# Patient Record
Sex: FEMALE | Race: WHITE
[De-identification: names, ages, dates, MRNs, and addresses within clinical notes are randomized per-mention and may not be internally consistent; named-entity substitution may affect disease eponyms.]

---

## 2017-02-05 ENCOUNTER — HOSPITAL ENCOUNTER (EMERGENCY)
Dept: HOSPITAL 62 - ER | Age: 37
Discharge: HOME | End: 2017-02-05
Payer: SELF-PAY

## 2017-02-05 VITALS — DIASTOLIC BLOOD PRESSURE: 60 MMHG | SYSTOLIC BLOOD PRESSURE: 122 MMHG

## 2017-02-05 DIAGNOSIS — F41.9: ICD-10-CM

## 2017-02-05 DIAGNOSIS — F17.200: ICD-10-CM

## 2017-02-05 DIAGNOSIS — L53.8: ICD-10-CM

## 2017-02-05 DIAGNOSIS — K12.1: Primary | ICD-10-CM

## 2017-02-05 PROCEDURE — 99283 EMERGENCY DEPT VISIT LOW MDM: CPT

## 2017-02-05 PROCEDURE — 87070 CULTURE OTHR SPECIMN AEROBIC: CPT

## 2017-02-05 PROCEDURE — 87880 STREP A ASSAY W/OPTIC: CPT

## 2017-02-05 NOTE — ER DOCUMENT REPORT
HPI





- HPI


Patient complains to provider of: sore throat


Onset: Yesterday


Onset/Duration: Gradual


Pain Level: 4


Context: 


36-year-old female that is on Suboxone is complaining of a sore throat and also 

some facial swelling and red skin in her hands after she was taking over-the-

counter medicine for a cold.  She has upper dentures.  She has no cough.  No 

chest pain or shortness of breath.  No abdominal pain.  No nausea vomiting or 

diarrhea.  No fever.


Associated Symptoms: None


Exacerbated by: Denies


Relieved by: Denies


Similar symptoms previously: No


Recently seen / treated by doctor: No





- ROS


ROS below otherwise negative: Yes


Systems Reviewed and Negative: Yes All other systems reviewed and negative





- CARDIOVASCULAR


Cardiovascular: DENIES: Chest pain





- DERM


Skin Color: Normal





Past Medical History





- General


Information source: Patient





- Social History


Smoking Status: Current Every Day Smoker


Chew tobacco use (# tins/day): No


Frequency of alcohol use: None


Drug Abuse: None


Lives with: Spouse/Significant other


Family History: Reviewed & Not Pertinent


Patient has suicidal ideation: No


Patient has homicidal ideation: No


Renal/ Medical History: Denies: Hx Peritoneal Dialysis


Psychiatric Medical History: Reports: Hx Bipolar Disorder


Surgical Hx: Negative





- Immunizations


Hx Diphtheria, Pertussis, Tetanus Vaccination: No





Vertical Provider Document





- CONSTITUTIONAL


Agree With Documented VS: Yes


Exam Limitations: No Limitations





- INFECTION CONTROL


TRAVEL OUTSIDE OF THE [place] IN LAST 30 DAYS: No





- HEENT


HEENT: Normocephalic, PERRLA.  negative: Conjuctival Injection, Pharyngeal 

Erythema


Notes: 


Canker sores buccal mucosa bilateral posterior pharynx is normal.  Uvula 

midline and nonswollen.





- NECK


Neck: Supple.  negative: Lymphadenopathy-Left, Lymphadenopathy-Right





- RESPIRATORY


Respiratory: Breath Sounds Normal, No Respiratory Distress


O2 Sat by Pulse Oximetry: 97





- CARDIOVASCULAR


Cardiovascular: Regular Rate, Regular Rhythm





- GI/ABDOMEN


Gastrointestinal: Abdomen Soft, Abdomen Non-Tender





- MUSCULOSKELETAL/EXTREMETIES


Musculoskeletal/Extremeties: MAEW, FROM





- NEURO


Level of Consciousness: Awake, Alert





- DERM


Integumentary: Dry - Skin , red, dorsal bilateral hands and forearms. No hives.





Course





- Vital Signs


Vital signs: 


 











Temp Pulse Resp BP Pulse Ox


 


 98.1 F   72   22 H  125/56 L  97 


 


 02/05/17 10:52  02/05/17 10:52  02/05/17 10:52  02/05/17 10:52  02/05/17 10:52














Discharge





- Discharge


Clinical Impression: 


 viral oral ulcers, Red skin, Anxiety





Condition: Good


Disposition: HOME, SELF-CARE


Instructions:  Mouth Sores ([facility]), Use of Diphenhydramine, Acid-Suppressing 

Medication ([facility])


Additional Instructions: 


take over the counter benadryl 50mg every 4-8 hours for rash


take over the counter pepcid 2omg twice a day


return to er if wrose


gargle with antibacterial mouthwash


new toothbrush when the ulcers go away


see dermatologist if rash persists


stop the over the counter cold medicine that may have caused the red skin


Prescriptions: 


Chlorhexidine Gluconate [Peridex] 15 ml MM QID #200 mouthwash


Referrals: 


LILA CAMARGO DO [ACTIVE STAFF] - Follow up as needed

## 2017-02-05 NOTE — ER DOCUMENT REPORT
ED Medical Screen (RME)





- General


Stated Complaint: SORE THROAT


Mode of Arrival: Ambulatory


Information source: Patient


Notes: 


Patient complains of sore throat for the past 3 weeks.  Patient complains of 

swelling to hands and feet





hx: Fibromyalgia, endometriosis





I have greeted and performed a rapid initial assessment of this patient.  A 

comprehensive ED assessment and evaluation of the patient, analysis of test 

results and completion of the medical decision making process will be conducted 

by additional ED providers.





- Related Data


Allergies/Adverse Reactions: 


 





No Known Allergies Allergy (Verified 02/05/17 10:53)


 











Physical Exam





- Vital signs


Vitals: 





 











Temp Pulse Resp BP Pulse Ox


 


 98.1 F   72   22 H  125/56 L  97 


 


 02/05/17 10:52  02/05/17 10:52  02/05/17 10:52  02/05/17 10:52  02/05/17 10:52














- HEENT


Mouth/Lips: No: Angioedema


Pharynx: Erythema





Course





- Vital Signs


Vital signs: 





 











Temp Pulse Resp BP Pulse Ox


 


 98.1 F   72   22 H  125/56 L  97 


 


 02/05/17 10:52  02/05/17 10:52  02/05/17 10:52  02/05/17 10:52  02/05/17 10:52

## 2017-11-27 ENCOUNTER — HOSPITAL ENCOUNTER (EMERGENCY)
Dept: HOSPITAL 62 - ER | Age: 37
Discharge: HOME | End: 2017-11-27
Payer: SELF-PAY

## 2017-11-27 VITALS — DIASTOLIC BLOOD PRESSURE: 58 MMHG | SYSTOLIC BLOOD PRESSURE: 105 MMHG

## 2017-11-27 DIAGNOSIS — M79.601: ICD-10-CM
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
## 2017-11-27 DIAGNOSIS — R52: ICD-10-CM

## 2017-11-27 DIAGNOSIS — S29.012A: Primary | ICD-10-CM

## 2017-11-27 DIAGNOSIS — F17.200: ICD-10-CM

## 2017-11-27 DIAGNOSIS — M54.6: ICD-10-CM

## 2017-11-27 PROCEDURE — 99283 EMERGENCY DEPT VISIT LOW MDM: CPT

## 2017-11-27 PROCEDURE — 71020: CPT

## 2017-11-27 NOTE — ER DOCUMENT REPORT
HPI





- HPI


Patient complains to provider of: Right upper back pain


Onset: Other - 4 months, worse over the past 3 days


Onset/Duration: Worse


Quality of pain: Sharp


Pain Level: 4


Context: 





Patient presents complaining of tender knots to right axilla that shoot pain 

around to her right upper back area for the past 4 months.  Patient states that 

certain positions would reproduce her pain and certainly movements of her right 

upper extremity.  Patient denies any injury.  Patient states that the pain 

recently became persistent and worse over the past 3 days.  Patient denies any 

cough or cold symptoms.  Patient denies any abdominal pain.  Patient denies any 

fever.


Associated Symptoms: Other - Right upper back tenderness


Exacerbated by: Movement


Relieved by: Remaining still


Similar symptoms previously: No


Recently seen / treated by doctor: No





- ROS


ROS below otherwise negative: Yes


Systems Reviewed and Negative: Yes All other systems reviewed and negative





- CONSTITUTIONAL


Constitutional: DENIES: Fever





- EENT


EENT: DENIES: Sore Throat





- NEURO


Neurology: DENIES: Headache





- CARDIOVASCULAR


Cardiovascular: DENIES: Chest pain





- RESPIRATORY


Respiratory: DENIES: Trouble Breathing, Coughing





- GASTROINTESTINAL


Gastrointestinal: DENIES: Abdominal Pain, Nausea





- MUSCULOSKELETAL


Musculoskeletal: REPORTS: Back Pain





- DERM


Skin Color: Normal


Skin Problems: None





Past Medical History





- General


Information source: Patient





- Social History


Smoking Status: Current Every Day Smoker


Chew tobacco use (# tins/day): No


Frequency of alcohol use: Occasional


Drug Abuse: None


Occupation: 


Family History: Reviewed & Not Pertinent


Patient has suicidal ideation: No


Patient has homicidal ideation: No


Renal/ Medical History: Reports: Other - Endometriosis.  Denies: Hx 

Peritoneal Dialysis


Psychiatric Medical History: Reports: Hx Bipolar Disorder


Past Surgical History: Reports: Hx Gynecologic Surgery





- Immunizations


Hx Diphtheria, Pertussis, Tetanus Vaccination: No





Vertical Provider Document





- CONSTITUTIONAL


Agree With Documented VS: Yes


Exam Limitations: No Limitations


General Appearance: WD/WN, No Apparent Distress





- INFECTION CONTROL


TRAVEL OUTSIDE OF THE [place] IN LAST 30 DAYS: No





- HEENT


HEENT: Atraumatic, Normocephalic





- NECK


Neck: Normal Inspection, Supple





- RESPIRATORY


Respiratory: Breath Sounds Normal, No Respiratory Distress, Chest Non-Tender


O2 Sat by Pulse Oximetry: 97





- CARDIOVASCULAR


Cardiovascular: Regular Rate, Regular Rhythm, No Murmur


Pulses: Normal: Radial





- BACK


Back: Abnormal Inspection - Tenderness with palpation of right thoracic back 

area.  Normal skin on examination..  negative: CVA Tenderness-Right, CVA 

Tenderness-Left





- MUSCULOSKELETAL/EXTREMETIES


Musculoskeletal/Extremeties: MAEW, Tender - Patient's right upper back 

tenderness reproduced with movement of right upper extremity, pain worsens with 

right arm abduction





- NEURO


Level of Consciousness: Awake, Alert, Appropriate


Motor/Sensory: No Motor Deficit





- DERM


Integumentary: Warm, Dry


Notes: 





Patient with 2 tender nodules to right axilla.  Suspect tender lymph nodes.  

Normal skin color and temperature overlying nodular lesions.





Course





- Re-evaluation


Re-evalutation: 





11/27/17 10:09


Consulted with Dr. Mckeon regarding patient presentation, discussed patient's 

exam findings.  Does not recommend any additional testing.  Agrees with 

discharge plan of care.


11/27/17


Patient concerned about possible breast cancer and does not feel that the 

tender nodules to right axillary lymph nodes.  Patient advised that she can 

follow-up as an outpatient with the primary doctor in obtain a mammogram to 

further evaluate these findings.  Given the chest x-ray report, no concern for 

pneumonia or pneumothorax.  Patient without any concerning symptoms for PE at 

this time, no abdominal tenderness, no concern for cholecystitis or pregnancy.





- Vital Signs


Vital signs: 


 











Temp Pulse Resp BP Pulse Ox


 


 97.7 F   78   16   105/60   97 


 


 11/27/17 07:03  11/27/17 07:03  11/27/17 07:03  11/27/17 07:03  11/27/17 07:03














- Diagnostic Test


Radiology reviewed: Reports reviewed





Discharge





- Discharge


Clinical Impression: 


 right axillary nodule tenderness





Muscle strain of right upper back


Qualifiers:


 Encounter type: initial encounter Qualified Code(s): S29.012A - Strain of 

muscle and tendon of back wall of thorax, initial encounter





Condition: Stable


Disposition: HOME, SELF-CARE


Instructions:  Growth or Mass, Pending Workup ([facility]), Muscle Relaxers ([facility]), 

Muscle Strain ([facility])


Additional Instructions: 


Return immediately for any new or worsening symptoms





Followup with your primary care provider, call tomorrow to make a followup 

appointment





Follow-up with the [facility] clinic.





Follow-up with the [facility], to seek assistance with getting a mammogram 

performed





A primary doctor can follow-up your chest x-ray report


Prescriptions: 


Methocarbamol [Robaxin 500 Mg Tablet] 500 mg PO QID PRN #24 tablet


 PRN Reason: 


Naproxen [Naprosyn 250 Nmg Tablet] 1 tab PO BID #14 tablet


Referrals: 


[facility] [Provider Group] - Follow up as needed


[facility] - Follow up as needed


[facility] [Provider Group] - Follow up tomorrow

## 2017-11-27 NOTE — RADIOLOGY REPORT (SQ)
EXAM DESCRIPTION:  CHEST PA/LAT



COMPLETED DATE/TIME:  11/27/2017 8:43 am



REASON FOR STUDY:  right thoracic back pain



COMPARISON:  None.



TECHNIQUE:  Frontal and lateral radiographic views of the chest acquired.



NUMBER OF VIEWS:  Two view.



LIMITATIONS:  None.



FINDINGS:  LUNGS AND PLEURA: No opacities, masses or pneumothorax. No pleural effusion.

MEDIASTINUM AND HILAR STRUCTURES: Small focal density projects over the left hilum, either a calcific
ation or artifact.  No contour abnormalities.

HEART AND VASCULAR STRUCTURES: Heart normal size.  No evidence for failure.

BONES: No acute findings.

HARDWARE: None in the chest.

OTHER: No other significant finding.



IMPRESSION:  NO SIGNIFICANT RADIOGRAPHIC FINDING IN THE CHEST.



TECHNICAL DOCUMENTATION:  JOB ID:  [id number]

 2011 [employer]- All Rights Reserved

## 2018-05-22 ENCOUNTER — HOSPITAL ENCOUNTER (EMERGENCY)
Dept: HOSPITAL 62 - ER | Age: 38
Discharge: HOME | End: 2018-05-22
Payer: SELF-PAY

## 2018-05-22 VITALS — DIASTOLIC BLOOD PRESSURE: 61 MMHG | SYSTOLIC BLOOD PRESSURE: 120 MMHG

## 2018-05-22 DIAGNOSIS — L03.211: Primary | ICD-10-CM

## 2018-05-22 DIAGNOSIS — R51: ICD-10-CM

## 2018-05-22 DIAGNOSIS — R68.84: ICD-10-CM

## 2018-05-22 DIAGNOSIS — K02.9: ICD-10-CM

## 2018-05-22 DIAGNOSIS — F17.210: ICD-10-CM

## 2018-05-22 LAB
ADD MANUAL DIFF: NO
ALBUMIN SERPL-MCNC: 4.2 G/DL (ref 3.5–5)
ALP SERPL-CCNC: 66 U/L (ref 38–126)
ALT SERPL-CCNC: 58 U/L (ref 9–52)
ANION GAP SERPL CALC-SCNC: 11 MMOL/L (ref 5–19)
APPEARANCE UR: (no result)
APTT PPP: YELLOW S
AST SERPL-CCNC: 59 U/L (ref 14–36)
BARBITURATES UR QL SCN: NEGATIVE
BASOPHILS # BLD AUTO: 0.1 10^3/UL (ref 0–0.2)
BASOPHILS NFR BLD AUTO: 0.8 % (ref 0–2)
BILIRUB DIRECT SERPL-MCNC: 0.3 MG/DL (ref 0–0.4)
BILIRUB SERPL-MCNC: 0.2 MG/DL (ref 0.1–1.1)
BILIRUB SERPL-MCNC: 0.5 MG/DL (ref 0.2–1.3)
BILIRUB UR QL STRIP: NEGATIVE
BUN SERPL-MCNC: 10 MG/DL (ref 7–20)
CALCIUM: 9.1 MG/DL (ref 8.4–10.2)
CHLORIDE SERPL-SCNC: 101 MMOL/L (ref 98–107)
CO2 SERPL-SCNC: 27 MMOL/L (ref 22–30)
CRP SERPL-MCNC: 34.8 MG/L (ref ?–10)
EOSINOPHIL # BLD AUTO: 0.3 10^3/UL (ref 0–0.6)
EOSINOPHIL NFR BLD AUTO: 3.9 % (ref 0–6)
ERYTHROCYTE [DISTWIDTH] IN BLOOD BY AUTOMATED COUNT: 13.9 % (ref 11.5–14)
ERYTHROCYTE [SEDIMENTATION RATE] IN BLOOD: 29 MM/HR (ref 0–20)
GLUCOSE SERPL-MCNC: 85 MG/DL (ref 75–110)
GLUCOSE UR STRIP-MCNC: NEGATIVE MG/DL
HCT VFR BLD CALC: 35.2 % (ref 36–47)
HGB BLD-MCNC: 12 G/DL (ref 12–15.5)
KETONES UR STRIP-MCNC: 80 MG/DL
LYMPHOCYTES # BLD AUTO: 1.7 10^3/UL (ref 0.5–4.7)
LYMPHOCYTES NFR BLD AUTO: 26.6 % (ref 13–45)
MCH RBC QN AUTO: 31.8 PG (ref 27–33.4)
MCHC RBC AUTO-ENTMCNC: 34.1 G/DL (ref 32–36)
MCV RBC AUTO: 93 FL (ref 80–97)
METHADONE UR QL SCN: NEGATIVE
MONOCYTES # BLD AUTO: 0.7 10^3/UL (ref 0.1–1.4)
MONOCYTES NFR BLD AUTO: 11.1 % (ref 3–13)
NEUTROPHILS # BLD AUTO: 3.7 10^3/UL (ref 1.7–8.2)
NEUTS SEG NFR BLD AUTO: 57.6 % (ref 42–78)
NITRITE UR QL STRIP: NEGATIVE
PCP UR QL SCN: NEGATIVE
PH UR STRIP: 5 [PH] (ref 5–9)
PLATELET # BLD: 286 10^3/UL (ref 150–450)
POTASSIUM SERPL-SCNC: 4 MMOL/L (ref 3.6–5)
PROT SERPL-MCNC: 7.2 G/DL (ref 6.3–8.2)
PROT UR STRIP-MCNC: NEGATIVE MG/DL
RBC # BLD AUTO: 3.77 10^6/UL (ref 3.72–5.28)
SODIUM SERPL-SCNC: 138.8 MMOL/L (ref 137–145)
SP GR UR STRIP: 1.01
TOTAL CELLS COUNTED % (AUTO): 100 %
URINE AMPHETAMINES SCREEN: NEGATIVE
URINE BENZODIAZEPINES SCREEN: NEGATIVE
URINE COCAINE SCREEN: (no result)
URINE MARIJUANA (THC) SCREEN: NEGATIVE
UROBILINOGEN UR-MCNC: NEGATIVE MG/DL (ref ?–2)
WBC # BLD AUTO: 6.5 10^3/UL (ref 4–10.5)

## 2018-05-22 PROCEDURE — 99406 BEHAV CHNG SMOKING 3-10 MIN: CPT

## 2018-05-22 PROCEDURE — 99284 EMERGENCY DEPT VISIT MOD MDM: CPT

## 2018-05-22 PROCEDURE — 85652 RBC SED RATE AUTOMATED: CPT

## 2018-05-22 PROCEDURE — 84703 CHORIONIC GONADOTROPIN ASSAY: CPT

## 2018-05-22 PROCEDURE — 36415 COLL VENOUS BLD VENIPUNCTURE: CPT

## 2018-05-22 PROCEDURE — 80307 DRUG TEST PRSMV CHEM ANLYZR: CPT

## 2018-05-22 PROCEDURE — 96361 HYDRATE IV INFUSION ADD-ON: CPT

## 2018-05-22 PROCEDURE — 70491 CT SOFT TISSUE NECK W/DYE: CPT

## 2018-05-22 PROCEDURE — 96372 THER/PROPH/DIAG INJ SC/IM: CPT

## 2018-05-22 PROCEDURE — 80053 COMPREHEN METABOLIC PANEL: CPT

## 2018-05-22 PROCEDURE — 86140 C-REACTIVE PROTEIN: CPT

## 2018-05-22 PROCEDURE — 85025 COMPLETE CBC W/AUTO DIFF WBC: CPT

## 2018-05-22 PROCEDURE — 87040 BLOOD CULTURE FOR BACTERIA: CPT

## 2018-05-22 PROCEDURE — 96374 THER/PROPH/DIAG INJ IV PUSH: CPT

## 2018-05-22 PROCEDURE — 81001 URINALYSIS AUTO W/SCOPE: CPT

## 2018-05-22 NOTE — RADIOLOGY REPORT (SQ)
EXAM DESCRIPTION:  CT SOFT TISSUE NECK WITH



COMPLETED DATE/TIME:  5/22/2018 1:26 pm



REASON FOR STUDY:  facial swelling



COMPARISON:  None.



TECHNIQUE:  Post IV contrasted scanning from skull base through lung apices with review of bone, soft
 tissue and lung windows.  Reconstructed coronal and sagittal MPR images reviewed.  All images stored
 on PACS.

All CT scanners at this facility use dose modulation, iterative reconstruction, and/or weight based d
osing when appropriate to reduce radiation dose to as low as reasonably achievable (ALARA).

[facility]: Dose Right  [facility]: CareDose    [facility]: Dose Right    [facility]: Teradose 4D    [facility]: Smart Technologies



CONTRAST TYPE AND DOSE:  contrast/concentration: Isovue 370.00 mg/ml; Total Contrast Delivered: 75.0 
ml; Total Saline Delivered: 55.0 ml



RENAL FUNCTION:  Creatinine 0.5



RADIATION DOSE:  CT Rad equipment meets quality standard of care and radiation dose reduction techniq
ues were employed. CTDIvol: 12.2 - 12.2 mGy. DLP: 762 mGy-cm. .



LIMITATIONS:  Patient moved throughout the study.



FINDINGS:  There is motion artifact throughout the study.  Limitations of this exam were discussed wi
noreen [place] in the emergency room.

Patient has left-sided facial cellulitis with soft tissue swelling over the pre maxillary and left ma
ndibular region of the face.

No well circumscribed abscess is identified.

No salivary gland stones.  No left-sided Wisner's duct stone is identified.

No bulky adenopathy.  Airway patent.

No gross vascular stenosis

There is mucous membrane thickening in the right maxillary and right ethmoid air cells.

Advanced dental caries



IMPRESSION:  Study significantly degraded by motion artifact.  There is facial cellulitis without ella
ss evidence of abscess.



TECHNICAL DOCUMENTATION:  JOB ID:  [id number]

Quality ID # [id number]: Final reports with documentation of one or more dose reduction techniques (e.g., Au
tomated exposure control, adjustment of the mA and/or kV according to patient size, use of iterative 
reconstruction technique)

 2011 [employer]- All Rights Reserved



Reading location - IP/workstation name: [email or web address]

## 2018-05-22 NOTE — ER DOCUMENT REPORT
ED Oral Problem





- General


Chief Complaint: Toothache


Stated Complaint: TOOTH PAIN


Time Seen by Provider: 05/22/18 11:06


Mode of Arrival: Ambulatory


Information source: Patient


Notes: 





38-year-old female presents to ED for complaint of dental pain with facial and 

jaw swelling.  She has a ulcerated area to the right upper gum swelling to the 

face up to the eye complain of a headache.  She states that the face and gum is 

been swollen for at least a week or more.  She states the ulcers been there for 

at least a week or more.  She states she does not have any insurance or any way 

to go to the dentist.  She states the pain is increasing and the swelling is 

increasing.  She is alert and oriented speaks with even full sentences.  She 

states that she had a fever of 102.2 last night but her temperature is 98.2 at 

this time.  She states she took Tylenol last night and got rid of the fever.


TRAVEL OUTSIDE OF THE [place] IN LAST 30 DAYS: No





- HPI


Patient complains to provider of: Swelling of face, Swelling of jaw, Toothache, 

Other - Ulcer to the left upper gum


Quality of pain: Pressure, Sharp, Throbbing


Severity: Severe


Pain Level: 5


Associated symptoms: Headache, Jaw pain, Toothache


Worsened by: Cold


Relieved by: Nothing


Similar symptoms previously: Yes


Recently seen / treated by doctor/dentist: No





- Related Data


Allergies/Adverse Reactions: 


 





No Known Allergies Allergy (Verified 05/22/18 09:51)


 











Past Medical History





- General


Information source: Patient





- Social History


Smoking Status: Current Every Day Smoker


Cigarette use (# per day): Yes - pack per day


Chew tobacco use (# tins/day): No


Smoking Education Provided: Yes - 4 minutes


Frequency of alcohol use: None


Drug Abuse: None


Lives with: Spouse/Significant other


Family History: Reviewed & Not Pertinent


Patient has suicidal ideation: No


Patient has homicidal ideation: No





- Past Medical History


Cardiac Medical History: Reports: None


Pulmonary Medical History: Reports: None


EENT Medical History: Reports: None


Neurological Medical History: Reports: None


Endocrine Medical History: Reports: None


Renal/ Medical History: Reports: Other - Endometriosis


Malignancy Medical History: Reports: None


GI Medical History: Reports: None


Musculoskeltal Medical History: Reports None


Skin Medical History: Reports None


Psychiatric Medical History: Reports: Hx Bipolar Disorder


Traumatic Medical History: Reports: None


Infectious Medical History: Reports: None


Past Surgical History: Reports: Hx Gynecologic Surgery - Upper endoscopy for 

endometriosis





- Immunizations


Hx Diphtheria, Pertussis, Tetanus Vaccination: No





Review of Systems





- Review of Systems


Constitutional: No symptoms reported


EENT: Mouth pain - A large ulcerated area to the left upper gum, Mouth swelling

, Dental problem, Other - Facial swelling to the left side


Cardiovascular: No symptoms reported


Respiratory: No symptoms reported


Gastrointestinal: No symptoms reported


Genitourinary: No symptoms reported


Female Genitourinary: No symptoms reported


Musculoskeletal: No symptoms reported


Skin: No symptoms reported


Hematologic/Lymphatic: No symptoms reported


Neurological/Psychological: No symptoms reported


-: Yes All other systems reviewed and negative





Physical Exam





- Vital signs


Vitals: 


 











Temp Pulse Resp BP Pulse Ox


 


 98.5 F   118 H  16   120/61   95 


 


 05/22/18 09:55  05/22/18 09:55  05/22/18 09:55  05/22/18 09:55  05/22/18 09:55











Interpretation: Normal





- General


General appearance: Appears well, Alert





- HEENT


Head: Normocephalic, Atraumatic


Eyes: Normal


Pupils: PERRL


Ears: Normal


External canal: Normal


Tympanic membrane: Normal


Sinus: Normal


Mouth/Lips: Caries, Other - Ulcerated area to the left upper gum swelling to 

the jaw and gum swelling to the face


Teeth diagram: 


  __________________________














  __________________________





 1 - Multiple decayed teeth with ulcerated area just lateral to the tooth #16, 

swelling to the face up to just below the eye





Pharynx: Normal


Neck: Normal





- Respiratory


Respiratory status: No respiratory distress


Chest status: Nontender


Breath sounds: Normal


Chest palpation: Normal





- Cardiovascular


Rhythm: Regular


Heart sounds: Normal auscultation


Murmur: No





- Abdominal


Inspection: Normal


Distension: No distension


Bowel sounds: Normal


Tenderness: Nontender


Organomegaly: No organomegaly





- Back


Back: Normal, Nontender





- Extremities


General upper extremity: Normal inspection, Nontender, Normal color, Normal ROM

, Normal temperature


General lower extremity: Normal inspection, Nontender, Normal color, Normal ROM

, Normal temperature, Normal weight bearing.  No: Fabrizio's sign





- Neurological


Neuro grossly intact: Yes


Cognition: Normal


Orientation: AAOx4


[place] Coma Scale Eye Opening: Spontaneous


[place] Coma Scale Verbal: Oriented


Seth Coma Scale Motor: Obeys Commands


Seth Coma Scale Total: 15


Speech: Normal


Motor strength normal: LUE, RUE, LLE, RLE


Sensory: Normal





- Psychological


Associated symptoms: Normal affect, Normal mood





- Skin


Skin Temperature: Warm


Skin Moisture: Dry


Skin Color: Normal





Course





- Re-evaluation


Re-evalutation: 





05/22/18 13:55


Report of CT called by Dr. Falk.  She states she does not see any abscesses 

to the face or jaw.  Patient will be treated with a IM injection of Rocephin 

and p.o. clindamycin and discharged home with a prescription for clindamycin.  

She will also be given a syringe full of viscous lidocaine.  Patient instructed 

please do not use any more cocaine before she is seen by the dentist and this 

problem corrected.  She will be given a  referral to the dentist and to Dr. Payne.


After performing a Medical Screening Examination, I estimate there is LOW risk 

for a DEEP SPACE INFECTION (e.g., JONY'S ANGINA OR RETROPHARYNGEAL ABSCESS), 

MENINGITIS, INTRACRANIAL HEMORRHAGE, or AIRWAY COMPROMISE, thus I consider the 

discharge disposition reasonable. Also, there is no evidence or peritonitis, 

sepsis, or toxicity.  I have reevaluated this patient multiple times and no 

significant life threatening changes are noted. The patient and I have 

discussed the diagnosis and risks, and we agree with discharging home with 

close follow-up with the understanding that symptoms and presentations can 

change. We also discussed returning to the Emergency Department immediately if 

new or worsening symptoms occur. We have discussed the symptoms which are most 

concerning (e.g., changing or worsening pain, trouble swallowing or breathing, 

neck stiffness or fever) that necessitate immediate return.





- Vital Signs


Vital signs: 


 











Temp Pulse Resp BP Pulse Ox


 


 98.5 F   118 H  16   120/61   95 


 


 05/22/18 09:55  05/22/18 09:55  05/22/18 09:55  05/22/18 09:55  05/22/18 09:55














- Laboratory


Result Diagrams: 


 05/22/18 12:14





 05/22/18 12:14


Laboratory results interpreted by me: 


 











  05/22/18 05/22/18 05/22/18





  11:28 12:14 12:14


 


Hct   35.2 L 


 


ESR   29 H 


 


Creatinine    0.51 L


 


AST    59 H


 


ALT    58 H


 


C-Reactive Protein    34.8 H


 


Urine Ketones  80 H  














- Diagnostic Test


Radiology reviewed: Image reviewed, Reports reviewed





Discharge





- Discharge


Clinical Impression: 


 Pain due to dental caries, Facial cellulitis





Condition: Stable


Disposition: HOME, SELF-CARE


Additional Instructions: 


TOOTHACHE:





     Your pain is due to dental decay.  The tooth must be repaired in order for 

you to feel better.  You will, therefore, be referred to a dentist.  We do not 

have dentists on the staff at [facility].


     Severe swelling or drainage around a tooth usually means a dental abscess.

  This also requires evaluation and treatment by the dentist, but antibiotics 

may be prescribed while awaiting dental treatment.


     You should be rechecked immediately if you develop major swelling of the 

face, increasing pain, a lump in the jaw or gums, headache, difficulty 

swallowing, or fever.





CELLULITIS:





     You have an infection of your skin and underlying soft tissues called 

cellulitis.  This is due to bacteria, which can enter through any break in the 

skin, or even through an irritated hair follicle.  Untreated, cellulitis will 

usually worsen.


     Antibiotics are required.  Usually, warm packs or warm soaks, and 

elevation of the infected area are recommended.  You should start getting 

better within 24 to 36 hours.


     Most infections respond quickly to the right medication. Follow-up care is 

important, however, to check for abscess (boil) formation, unsuspected foreign 

body, or resistant infection.


     If you develop fever, chills, or if the area of infection is becoming 

rapidly more swollen or painful, call the doctor at once.





CLINDAMYCIN:


     You have been given a prescription for the antibiotic clindamycin.  It is 

often prescribed for infections in the mouth, such as dental infections or 

abscesses, and for skin infections due to MRSA.  It's important that you take 

all the medication, unless instructed otherwise by your physician.  Failure to 

complete the entire course can result in relapse of your condition.


     Common side effects of antibiotics include nausea, intestinal cramping, or 

diarrhea.  Women may develop vaginal yeast infections, and babies can get yeast 

(thrush) in the mouth following the use of antibiotics.  Contact your physician 

if you develop significant side effects from this medication.


     Allergy to this antibiotic can result in hives, wheezing, faintness, or 

itching.  If symptoms of allergy occur, stop the medication and call the doctor.





Sean





     You have been given an injection of an antibiotic called Rocephin (

ceftriaxone).  Sometimes the injection must be combined with antibiotic pills.  

For some infections, such as an uncomplicated ear infection, Rocephin provides 

all the antibiotic that's needed.


     The antibiotic will be in your body for about two days.  For serious 

infections, we usually repeat doses of Rocephin daily.


     Side effects are very unusual following a shot.  Women may develop vaginal 

yeast infections, and babies can get yeast (thrush) in the mouth following the 

use of antibiotics.  Contact your physician if you have symptoms with this 

medication.


     Allergy to this antibiotic can result in hives, wheezing, faintness, or 

itching.  If symptoms of allergy occur, call the doctor at once.





You have been given an IM injection of Rocephin and prescription for 

clindamycin for your dental infection.  You have been given clindamycin in the 

emergency room also.  Please do not use any more cocaine until you get the 

dental infection cleared up.  Please follow-up with dentist or oral surgeon as 

soon as possible to correct your multiple cavities in your mouth.








FOLLOW-UP CARE:


     You have been referred for follow-up care to the dentists listed below.  

Call the dentists office for an appointment as you were instructed or within 

the next two days.  If you experience worsening or a significant change in your 

symptoms, notify the physician immediately or return to the Emergency 

Department at any time for re-evaluation.





[facility]


[address] [place], [place]


[phone]





[facility]


[address] [place] [address] [phone]





[facility]


[address]


[phone]





[facility]


[address]


[facility]


[phone]





[facility]


[address]


[phone]


[email or web address]





[facility]


[address] [place]  [address] [phone]


Monday-Thursdays  8:00am to 5:00 pm


Will see patients from other [facility].


Charges based on income and family size and


accepts Medicare, Medicaid, and Insurances


Will pull molars





[facility] [employer] [facility]


Student Clinics


[facility] [address]


[phone]


Hours of Operation


   8:00 am - 4:30 pm weekdays





The following dental offices accept Medicaid:





   [facility]   [phone]


   Dr. Jin         [phone]


   Dr. Mohan         [phone]


   Dr. Mulligan         [phone]


   Dr. Fernández         [phone]


   Ernesto Pimentel Lutsavage, and [place]


      oral surgery      [phone]


   Dr. Parson ([place])      [phone]


   Dr. Hannah ([place])   [phone]


   [facility]      [phone]


   Caro Villalobos ([place])   [phone]


   Dr. Thomson ([place])      [phone]


   [facility]      [phone]


   [facility]   [phone]


   [facility]   [phone]


   Dr. Zamarripa ([place])      [phone]


   Caro Dodson and 


      ([place])      [phone]





   Medicaid Care Line      [phone]


Prescriptions: 


Clindamycin HCl 300 mg PO QID #40 capsule


Forms:  Smoking Cessation Education


Referrals: 


SIDDHARTHA PAYNE DO [ASSOCIATE] - Follow up as needed

## 2018-09-09 ENCOUNTER — HOSPITAL ENCOUNTER (EMERGENCY)
Dept: HOSPITAL 62 - ER | Age: 38
Discharge: HOME | End: 2018-09-09
Payer: SELF-PAY

## 2018-09-09 VITALS — SYSTOLIC BLOOD PRESSURE: 124 MMHG | DIASTOLIC BLOOD PRESSURE: 77 MMHG

## 2018-09-09 DIAGNOSIS — Z91.040: ICD-10-CM

## 2018-09-09 DIAGNOSIS — F17.200: ICD-10-CM

## 2018-09-09 DIAGNOSIS — R19.7: ICD-10-CM

## 2018-09-09 DIAGNOSIS — R11.2: Primary | ICD-10-CM

## 2018-09-09 LAB
ADD MANUAL DIFF: NO
ALBUMIN SERPL-MCNC: 4.5 G/DL (ref 3.5–5)
ALP SERPL-CCNC: 94 U/L (ref 38–126)
ALT SERPL-CCNC: 157 U/L (ref 9–52)
ANION GAP SERPL CALC-SCNC: 10 MMOL/L (ref 5–19)
APPEARANCE UR: (no result)
APTT PPP: YELLOW S
AST SERPL-CCNC: 62 U/L (ref 14–36)
BASOPHILS # BLD AUTO: 0 10^3/UL (ref 0–0.2)
BASOPHILS NFR BLD AUTO: 0.7 % (ref 0–2)
BILIRUB SERPL-MCNC: 0.3 MG/DL (ref 0.2–1.3)
BILIRUB UR QL STRIP: NEGATIVE
BUN SERPL-MCNC: 20 MG/DL (ref 7–20)
CALCIUM: 9.3 MG/DL (ref 8.4–10.2)
CHLORIDE SERPL-SCNC: 108 MMOL/L (ref 98–107)
CO2 SERPL-SCNC: 25 MMOL/L (ref 22–30)
EOSINOPHIL # BLD AUTO: 0.2 10^3/UL (ref 0–0.6)
EOSINOPHIL NFR BLD AUTO: 2.7 % (ref 0–6)
ERYTHROCYTE [DISTWIDTH] IN BLOOD BY AUTOMATED COUNT: 16.1 % (ref 11.5–14)
GLUCOSE SERPL-MCNC: 85 MG/DL (ref 75–110)
GLUCOSE UR STRIP-MCNC: NEGATIVE MG/DL
HCT VFR BLD CALC: 40.3 % (ref 36–47)
HGB BLD-MCNC: 13.5 G/DL (ref 12–15.5)
KETONES UR STRIP-MCNC: NEGATIVE MG/DL
LYMPHOCYTES # BLD AUTO: 2.2 10^3/UL (ref 0.5–4.7)
LYMPHOCYTES NFR BLD AUTO: 38.1 % (ref 13–45)
MCH RBC QN AUTO: 30 PG (ref 27–33.4)
MCHC RBC AUTO-ENTMCNC: 33.5 G/DL (ref 32–36)
MCV RBC AUTO: 90 FL (ref 80–97)
MONOCYTES # BLD AUTO: 0.3 10^3/UL (ref 0.1–1.4)
MONOCYTES NFR BLD AUTO: 5.5 % (ref 3–13)
NEUTROPHILS # BLD AUTO: 3.1 10^3/UL (ref 1.7–8.2)
NEUTS SEG NFR BLD AUTO: 53 % (ref 42–78)
NITRITE UR QL STRIP: NEGATIVE
PH UR STRIP: 5 [PH] (ref 5–9)
PLATELET # BLD: 311 10^3/UL (ref 150–450)
POTASSIUM SERPL-SCNC: 4.6 MMOL/L (ref 3.6–5)
PROT SERPL-MCNC: 8.2 G/DL (ref 6.3–8.2)
PROT UR STRIP-MCNC: NEGATIVE MG/DL
RBC # BLD AUTO: 4.49 10^6/UL (ref 3.72–5.28)
SODIUM SERPL-SCNC: 142.6 MMOL/L (ref 137–145)
SP GR UR STRIP: 1.03
TOTAL CELLS COUNTED % (AUTO): 100 %
UROBILINOGEN UR-MCNC: 4 MG/DL (ref ?–2)
WBC # BLD AUTO: 5.9 10^3/UL (ref 4–10.5)

## 2018-09-09 PROCEDURE — 81025 URINE PREGNANCY TEST: CPT

## 2018-09-09 PROCEDURE — 81001 URINALYSIS AUTO W/SCOPE: CPT

## 2018-09-09 PROCEDURE — 36415 COLL VENOUS BLD VENIPUNCTURE: CPT

## 2018-09-09 PROCEDURE — 99284 EMERGENCY DEPT VISIT MOD MDM: CPT

## 2018-09-09 PROCEDURE — 85025 COMPLETE CBC W/AUTO DIFF WBC: CPT

## 2018-09-09 PROCEDURE — 80053 COMPREHEN METABOLIC PANEL: CPT

## 2018-09-09 NOTE — ER DOCUMENT REPORT
ED General





- General


Chief Complaint: Nausea/Vomiting


Stated Complaint: NAUSEA/VOMITING


Time Seen by Provider: 09/09/18 21:35


Notes: 





Patient is a 38-year-old female who presents with chief complaint of withdrawal 

symptoms.  Patient reports she has nausea, vomiting, diarrhea, chills, 

shakiness and sweats.  Patient reports that she typically takes Subutex 8 mg 4 

times daily.  Patient reports this is typically prescribed by Dr. Chappell at 

[facility] here in [place].  Patient reports that she has been unable to see 

that provider this month for her refill as she does not have the money for the 

office visit.  Patient reports that over the last few days she has been 

"borrowing Subutex pills from friends".  Patient is requesting a prescription 

for her Subutex until the first of the month when she states she will be able 

to follow-up with her regular provider.  Patient further reports she is court 

ordered to take the Subutex.


TRAVEL OUTSIDE OF THE [place] IN LAST 30 DAYS: No





- Related Data


Allergies/Adverse Reactions: 


 





latex Allergy (Verified 09/09/18 20:25)


 











Past Medical History





- General


Information source: Patient





- Social History


Smoking Status: Current Every Day Smoker


Frequency of alcohol use: None


Drug Abuse: None


Family History: Reviewed & Not Pertinent


Patient has suicidal ideation: No


Patient has homicidal ideation: No


Renal/ Medical History: Denies: Hx Peritoneal Dialysis


Psychiatric Medical History: Reports: Hx Bipolar Disorder


Past Surgical History: Reports: Hx Gynecologic Surgery - Upper endoscopy for 

endometriosis





- Immunizations


Hx Diphtheria, Pertussis, Tetanus Vaccination: No





Review of Systems





- Review of Systems


Constitutional: No symptoms reported


EENT: No symptoms reported


Cardiovascular: No symptoms reported


Respiratory: No symptoms reported


Gastrointestinal: Diarrhea, Nausea, Vomiting


Genitourinary: No symptoms reported


Female Genitourinary: No symptoms reported


Musculoskeletal: No symptoms reported


Skin: No symptoms reported


Hematologic/Lymphatic: No symptoms reported


Neurological/Psychological: No symptoms reported





Physical Exam





- Vital signs


Vitals: 


 











Temp Pulse BP Pulse Ox


 


 98.4 F   80   130/77 H  99 


 


 09/09/18 20:08  09/09/18 20:08  09/09/18 20:08  09/09/18 20:08














- Notes


Notes: 





PHYSICAL EXAMINATION:





GENERAL: Well-appearing, anxious and in no acute distress.





HEAD: Atraumatic, normocephalic.





EYES: Pupils equal round and reactive to light, extraocular movements intact, 

conjunctiva are normal.





ENT: Nares patent, oropharynx clear without exudates.  Moist mucous membranes.





NECK: Normal range of motion, supple without lymphadenopathy





LUNGS: Breath sounds clear to auscultation bilaterally and equal.  No wheezes 

rales or rhonchi.





HEART: Regular rate and rhythm without murmurs





ABDOMEN: Soft, nontender, nondistended abdomen.  No guarding, no rebound.  No 

masses appreciated.





Female : deferred





Musculoskeletal: Normal range of motion, no pitting or edema.  No cyanosis.





NEUROLOGICAL: Cranial nerves grossly intact.  Normal speech, normal gait.  

Normal sensory, motor exams





PSYCH: Normal mood, normal affect.





SKIN: Warm, Dry, normal turgor, no rashes or lesions noted.





Course





- Re-evaluation


Re-evalutation: 





CBC, CMP and urinalysis are unremarkable for any acute findings.  These were 

ordered per nursing protocol.





Consult was made with Dr. Collins regarding patients request for subutex.  He 

advised to give a one time dose of 8 mg subutex in the emergency department 

only as we are unable to send patient home with a prescription.  This was 

discussed with the patient and she was informed that we are unable to send her 

home with a prescription however I can send her home with a prescription for 

Zofran for her nausea.











- Vital Signs


Vital signs: 


 











Temp Pulse Resp BP Pulse Ox


 


 98.4 F   80      130/77 H  99 


 


 09/09/18 20:08  09/09/18 20:08     09/09/18 20:08  09/09/18 20:08














- Laboratory


Result Diagrams: 


 09/09/18 21:18





 09/09/18 21:18


Laboratory results interpreted by me: 


 











  09/09/18 09/09/18 09/09/18





  21:18 21:18 21:18


 


RDW  16.1 H  


 


Chloride   108 H 


 


AST   62 H 


 


ALT   157 H 


 


Urine Urobilinogen    4.0 H














Discharge





- Discharge


Clinical Impression: 


Nausea & vomiting


Qualifiers:


 Vomiting type: unspecified Vomiting Intractability: unspecified Qualified Code(

s): R11.2 - Nausea with vomiting, unspecified





Diarrhea


Qualifiers:


 Diarrhea type: unspecified type Qualified Code(s): R19.7 - Diarrhea, 

unspecified





Condition: Stable


Disposition: HOME, SELF-CARE


Additional Instructions: 


The symptoms you are experiencing could likely be caused by withdrawal.  We do 

not prescribe Subutex in the emergency department.  Please follow-up with Dr. Chappell at [facility] for continued refills of your Subutex.


Prescriptions: 


Ondansetron [Zofran Odt 4 mg Tablet] 1 - 2 tab PO Q4H PRN #15 tab.rapdis


 PRN Reason: For Nausea/Vomiting

## 2018-12-18 ENCOUNTER — HOSPITAL ENCOUNTER (EMERGENCY)
Dept: HOSPITAL 62 - ER | Age: 38
LOS: 1 days | Discharge: HOME | End: 2018-12-19
Payer: SELF-PAY

## 2018-12-18 DIAGNOSIS — S06.0X0A: Primary | ICD-10-CM

## 2018-12-18 DIAGNOSIS — F17.200: ICD-10-CM

## 2018-12-18 DIAGNOSIS — Z91.040: ICD-10-CM
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
## 2018-12-18 DIAGNOSIS — M79.10: ICD-10-CM
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
## 2018-12-18 PROCEDURE — 70450 CT HEAD/BRAIN W/O DYE: CPT

## 2018-12-18 PROCEDURE — 99284 EMERGENCY DEPT VISIT MOD MDM: CPT

## 2018-12-18 PROCEDURE — S0119 ONDANSETRON 4 MG: HCPCS
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
## 2018-12-19 VITALS — SYSTOLIC BLOOD PRESSURE: 115 MMHG | DIASTOLIC BLOOD PRESSURE: 76 MMHG
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
## 2019-02-18 ENCOUNTER — HOSPITAL ENCOUNTER (EMERGENCY)
Dept: HOSPITAL 62 - ER | Age: 39
Discharge: HOME | End: 2019-02-18
Payer: SELF-PAY

## 2019-02-18 VITALS — DIASTOLIC BLOOD PRESSURE: 74 MMHG | SYSTOLIC BLOOD PRESSURE: 124 MMHG

## 2019-02-18 DIAGNOSIS — R19.4: ICD-10-CM

## 2019-02-18 DIAGNOSIS — J06.9: ICD-10-CM

## 2019-02-18 DIAGNOSIS — R68.83: ICD-10-CM

## 2019-02-18 DIAGNOSIS — R09.81: ICD-10-CM

## 2019-02-18 DIAGNOSIS — R11.2: ICD-10-CM

## 2019-02-18 DIAGNOSIS — B83.9: Primary | ICD-10-CM

## 2019-02-18 DIAGNOSIS — Z91.040: ICD-10-CM

## 2019-02-18 DIAGNOSIS — R41.9: ICD-10-CM

## 2019-02-18 DIAGNOSIS — R05: ICD-10-CM

## 2019-02-18 DIAGNOSIS — R00.2: ICD-10-CM

## 2019-02-18 DIAGNOSIS — R19.7: ICD-10-CM

## 2019-02-18 DIAGNOSIS — R44.0: ICD-10-CM

## 2019-02-18 DIAGNOSIS — F14.10: ICD-10-CM

## 2019-02-18 LAB
A TYPE INFLUENZA AG: NEGATIVE
B INFLUENZA AG: NEGATIVE

## 2019-02-18 PROCEDURE — S0119 ONDANSETRON 4 MG: HCPCS

## 2019-02-18 PROCEDURE — 71046 X-RAY EXAM CHEST 2 VIEWS: CPT

## 2019-02-18 PROCEDURE — 99283 EMERGENCY DEPT VISIT LOW MDM: CPT

## 2019-02-18 PROCEDURE — 87804 INFLUENZA ASSAY W/OPTIC: CPT

## 2019-02-18 NOTE — ER DOCUMENT REPORT
ED General





- General


Chief Complaint: Abdominal Pain


Stated Complaint: THROWING UP


Time Seen by Provider: 02/18/19 09:03


Notes: 





38-year-old female presents to the emergency department with multiple 

complaints.  She complains of a cough that she has had for the last week.  

States she is coughing up some green yellow sputum.  Complains of some nasal 

congestion with that.  States she has had chills but no fever.  She denies 

nausea or vomiting says she had a few loose stools.  She stated that she thought

she saw worms swimming in her stool.  She brought us in a sample.  The patient 

also admits to doing cocaine.  She states at times she will hear voices on and 

off no suicidal homicidal thoughts.  No visual hallucinations but will hear 

voices and baby is crying.  She states that usually after ingestion of drugs.  

The patient states she has not done drugs in a few days.  She denies any 

abdominal pain just nausea.


TRAVEL OUTSIDE OF THE [place] IN LAST 30 DAYS: No





- Related Data


Allergies/Adverse Reactions: 


                                        





latex Allergy (Verified 02/18/19 08:26)


   











Past Medical History





- Social History


Smoking Status: Current Every Day Smoker


Frequency of alcohol use: None


Drug Abuse: Cocaine


Family History: Reviewed & Not Pertinent


Patient has suicidal ideation: No


Patient has homicidal ideation: No


Renal/ Medical History: Denies: Hx Peritoneal Dialysis


Psychiatric Medical History: Reports: Hx Bipolar Disorder


Past Surgical History: Reports: Hx Gynecologic Surgery - Upper endoscopy for 

endometriosis





- Immunizations


Hx Diphtheria, Pertussis, Tetanus Vaccination: No





Review of Systems





- Review of Systems


Constitutional: Chills.  denies: Fever


EENT: Nose congestion, Nose discharge


Cardiovascular: Palpitations.  denies: Chest pain, Heart racing


Respiratory: Cough.  denies: Short of breath


Gastrointestinal: Diarrhea, Nausea, Vomiting.  denies: Abdominal pain


Genitourinary: denies: Dysuria, Hematuria


Neurological/Psychological: Anxiety, Hallucinations.  denies: Suicidal ideation


-: Yes All other systems reviewed and negative





Physical Exam





- Vital signs


Vitals: 


                                        











Temp Pulse Resp BP Pulse Ox


 


 98.9 F   71   18   104/71   100 


 


 02/18/19 08:35  02/18/19 08:35  02/18/19 08:35  02/18/19 08:35  02/18/19 08:35














- Notes


Notes: 





GENERAL_APPEARANCE: well_nourished, alert, cooperative, very anxious and 

animated


 VITALS: reviewed, see vital signs table.


 HEAD: no_swelling\tenderness on the head.


 EYES: PERRL, EOMI, conjunctiva_clear.


 NOSE: Clear_nasal_discharge.  Mild turbinate inflammation


 MOUTH: (-)decreased moisture.  No drooling or stridor, poor dentition


 THROAT: Mild throat_inflammation, no_airway_obstruction. no_lymphadenopathy


 NECK: supple, no_neck_tenderness, (-)thyromegaly.


 BACK: no_back_tenderness.


 CHEST_WALL: no_chest_tenderness.


 LUNGS: no_wheezing, no_rales, no_rhonchi, (-)accessory muscle use, good air 

exchange bilateral.


 HEART: normal_rate, normal_rhythm, normal_S1, normal_S2, (-)S3, (-)S4, 

no_murmur, no_rub.


 ABDOMEN: normal_BS, soft, no_abd_tenderness, (-)guarding, (-)rebound, 

no_organomegaly, no_abd_masses.


 EXTREMITIES: good pulses in all_extremities, no_swelling\tenderness in the 

extremities, no_edema.


 SKIN: warm, dry, good_color, no_rash.


 MENTAL_STATUS: speech_clear, oriented_X_3, anxious_affect, 

responds_appropriately to questions.


 NEURO: Neg Motor or Sensory Deficits on exam, CN 2-12 intact, DTR 2+ symmetric 

x 4, No cerbellar signs


 PSYCH: Patient denies suicidal or homicidal ideation denies visual 

hallucinations but does hear babies crying and voices from time to time.  She 

does have insight into the situation.  States she does see what she thinks is 

worms in her stool.














Course





- Re-evaluation


Re-evalutation: 





02/18/19 09:21


The patient presents slightly disheveled.  She is having some auditory 

hallucinations I do not see any worms in her sample she brought.  She complains 

of a cough we will get an x-ray for pneumonia give her some Zofran.  Still looks

very anxious and may be under the influence of cocaine however she does have a 

sober ride with her.  She has no suicidal or homicidal thoughts.  States she 

hears things from time to time but no command hallucinations.  I spoke with her 

about this.  We spoke about cessation of drug use.  We will treat her 

symptomatically we will swab her for the flu.  Chest x-ray to assess for 

pneumonia.  Empiric treatment in case she does have any round worms or parasites

though I think this is less likely





02/18/19 10:43








                                        





Chest X-Ray  02/18/19 09:03


IMPRESSION:  1.  No significant interval changes since the previous examination 

dated 11/27/2017.  No acute findings.


 








Flu is negative chest x-ray is normal.  Patient is resting comfortably.  I will 

give her something for her cough and antihistamine.  Also will give her empiric 

medicine for her feared parasitic infection.








- Vital Signs


Vital signs: 


                                        











Temp Pulse Resp BP Pulse Ox


 


 98.9 F   71   18   104/71   100 


 


 02/18/19 08:35  02/18/19 08:35  02/18/19 08:35  02/18/19 08:35  02/18/19 08:35














Discharge





- Discharge


Clinical Impression: 


 Worms in stool





URI (upper respiratory infection)


Qualifiers:


 URI type: unspecified viral URI Qualified Code(s): J06.9 - Acute upper 

respiratory infection, unspecified





Condition: Good


Disposition: HOME, SELF-CARE


Instructions:  Upper Respiratory Illness ([facility])


Prescriptions: 


Benzonatate [Tessalon Perles 100 mg Capsule] 100 mg PO Q8HP PRN #40 capsule


 PRN Reason: Cough


Ivermectin [Stromectol 3 mg Tablet] 3 tab PO ONCE #3 tablet


Ondansetron [Zofran Odt 4 mg Tablet] 1 - 2 tab PO Q4H PRN #15 tab.rapdis


 PRN Reason: For Nausea/Vomiting

## 2019-02-18 NOTE — RADIOLOGY REPORT (SQ)
EXAM DESCRIPTION:  CHEST 2 VIEWS



COMPLETED DATE/TIME:  2/18/2019 9:18 am



REASON FOR STUDY:  cough



COMPARISON:  11/27/2017



EXAM PARAMETERS:  NUMBER OF VIEWS: two views

TECHNIQUE: Digital Frontal and Lateral radiographic views of the chest acquired.

RADIATION DOSE: NA

LIMITATIONS: none



FINDINGS:  LUNGS AND PLEURA: No opacities, masses or pneumothorax. No pleural effusion.

MEDIASTINUM AND HILAR STRUCTURES: No masses or contour abnormalities.

HEART AND VASCULAR STRUCTURES: Heart normal size.  No evidence for failure.

BONES: No acute findings.

HARDWARE: None in the chest.

OTHER: No other significant finding.



IMPRESSION:  1.  No significant interval changes since the previous examination dated 11/27/2017.  No
 acute findings.



TECHNICAL DOCUMENTATION:  JOB ID:  [id number]

 2011 [employer]- All Rights Reserved



Reading location - IP/workstation name: [email or web address]

## 2020-04-09 ENCOUNTER — HOSPITAL ENCOUNTER (EMERGENCY)
Dept: HOSPITAL 62 - ER | Age: 40
Discharge: LEFT BEFORE DISCHARGE | End: 2020-04-09
Payer: SELF-PAY

## 2020-04-09 VITALS — SYSTOLIC BLOOD PRESSURE: 108 MMHG | DIASTOLIC BLOOD PRESSURE: 71 MMHG

## 2020-04-09 DIAGNOSIS — Z53.21: Primary | ICD-10-CM

## 2020-04-11 ENCOUNTER — HOSPITAL ENCOUNTER (EMERGENCY)
Dept: HOSPITAL 62 - ER | Age: 40
Discharge: HOME | End: 2020-04-11
Payer: SELF-PAY

## 2020-04-11 VITALS — DIASTOLIC BLOOD PRESSURE: 67 MMHG | SYSTOLIC BLOOD PRESSURE: 102 MMHG

## 2020-04-11 DIAGNOSIS — Z91.040: ICD-10-CM

## 2020-04-11 DIAGNOSIS — R51: ICD-10-CM

## 2020-04-11 DIAGNOSIS — R68.83: ICD-10-CM

## 2020-04-11 DIAGNOSIS — M79.10: ICD-10-CM

## 2020-04-11 DIAGNOSIS — J45.909: ICD-10-CM

## 2020-04-11 DIAGNOSIS — F17.210: ICD-10-CM

## 2020-04-11 DIAGNOSIS — F19.10: Primary | ICD-10-CM

## 2020-04-11 DIAGNOSIS — R10.9: ICD-10-CM

## 2020-04-11 DIAGNOSIS — R19.7: ICD-10-CM

## 2020-04-11 DIAGNOSIS — R11.2: ICD-10-CM

## 2020-04-11 LAB
ADD MANUAL DIFF: NO
ALBUMIN SERPL-MCNC: 4 G/DL (ref 3.5–5)
ALP SERPL-CCNC: 66 U/L (ref 38–126)
ANION GAP SERPL CALC-SCNC: 5 MMOL/L (ref 5–19)
APAP SERPL-MCNC: < 10 UG/ML (ref 10–30)
APPEARANCE UR: (no result)
APTT PPP: YELLOW S
AST SERPL-CCNC: 24 U/L (ref 14–36)
BARBITURATES UR QL SCN: NEGATIVE
BASOPHILS # BLD AUTO: 0.1 10^3/UL (ref 0–0.2)
BASOPHILS NFR BLD AUTO: 1 % (ref 0–2)
BILIRUB DIRECT SERPL-MCNC: 0.2 MG/DL (ref 0–0.4)
BILIRUB SERPL-MCNC: 0.2 MG/DL (ref 0.2–1.3)
BILIRUB UR QL STRIP: NEGATIVE
BUN SERPL-MCNC: 17 MG/DL (ref 7–20)
CALCIUM: 8.9 MG/DL (ref 8.4–10.2)
CHLORIDE SERPL-SCNC: 103 MMOL/L (ref 98–107)
CO2 SERPL-SCNC: 28 MMOL/L (ref 22–30)
EOSINOPHIL # BLD AUTO: 0.2 10^3/UL (ref 0–0.6)
EOSINOPHIL NFR BLD AUTO: 2.6 % (ref 0–6)
ERYTHROCYTE [DISTWIDTH] IN BLOOD BY AUTOMATED COUNT: 14.1 % (ref 11.5–14)
ETHANOL SERPL-MCNC: < 10 MG/DL
GLUCOSE SERPL-MCNC: 90 MG/DL (ref 75–110)
GLUCOSE UR STRIP-MCNC: NEGATIVE MG/DL
HCT VFR BLD CALC: 36.6 % (ref 36–47)
HGB BLD-MCNC: 12.7 G/DL (ref 12–15.5)
KETONES UR STRIP-MCNC: NEGATIVE MG/DL
LYMPHOCYTES # BLD AUTO: 2.1 10^3/UL (ref 0.5–4.7)
LYMPHOCYTES NFR BLD AUTO: 31 % (ref 13–45)
MCH RBC QN AUTO: 31 PG (ref 27–33.4)
MCHC RBC AUTO-ENTMCNC: 34.6 G/DL (ref 32–36)
MCV RBC AUTO: 90 FL (ref 80–97)
METHADONE UR QL SCN: NEGATIVE
MONOCYTES # BLD AUTO: 0.3 10^3/UL (ref 0.1–1.4)
MONOCYTES NFR BLD AUTO: 5.1 % (ref 3–13)
NEUTROPHILS # BLD AUTO: 4 10^3/UL (ref 1.7–8.2)
NEUTS SEG NFR BLD AUTO: 60.3 % (ref 42–78)
NITRITE UR QL STRIP: NEGATIVE
PCP UR QL SCN: NEGATIVE
PH UR STRIP: 5 [PH] (ref 5–9)
PLATELET # BLD: 295 10^3/UL (ref 150–450)
POTASSIUM SERPL-SCNC: 4.2 MMOL/L (ref 3.6–5)
PROT SERPL-MCNC: 7 G/DL (ref 6.3–8.2)
PROT UR STRIP-MCNC: NEGATIVE MG/DL
RBC # BLD AUTO: 4.09 10^6/UL (ref 3.72–5.28)
SP GR UR STRIP: 1.02
TOTAL CELLS COUNTED % (AUTO): 100 %
URINE AMPHETAMINES SCREEN: NEGATIVE
URINE BENZODIAZEPINES SCREEN: (no result)
URINE COCAINE SCREEN: (no result)
URINE MARIJUANA (THC) SCREEN: NEGATIVE
UROBILINOGEN UR-MCNC: NEGATIVE MG/DL (ref ?–2)
WBC # BLD AUTO: 6.6 10^3/UL (ref 4–10.5)

## 2020-04-11 PROCEDURE — 83735 ASSAY OF MAGNESIUM: CPT

## 2020-04-11 PROCEDURE — 84702 CHORIONIC GONADOTROPIN TEST: CPT

## 2020-04-11 PROCEDURE — 74022 RADEX COMPL AQT ABD SERIES: CPT

## 2020-04-11 PROCEDURE — 83690 ASSAY OF LIPASE: CPT

## 2020-04-11 PROCEDURE — 81001 URINALYSIS AUTO W/SCOPE: CPT

## 2020-04-11 PROCEDURE — 85025 COMPLETE CBC W/AUTO DIFF WBC: CPT

## 2020-04-11 PROCEDURE — 80053 COMPREHEN METABOLIC PANEL: CPT

## 2020-04-11 PROCEDURE — 83605 ASSAY OF LACTIC ACID: CPT

## 2020-04-11 PROCEDURE — 96361 HYDRATE IV INFUSION ADD-ON: CPT

## 2020-04-11 PROCEDURE — 99284 EMERGENCY DEPT VISIT MOD MDM: CPT

## 2020-04-11 PROCEDURE — 87040 BLOOD CULTURE FOR BACTERIA: CPT

## 2020-04-11 PROCEDURE — 36415 COLL VENOUS BLD VENIPUNCTURE: CPT

## 2020-04-11 PROCEDURE — 80307 DRUG TEST PRSMV CHEM ANLYZR: CPT

## 2020-04-11 PROCEDURE — 96360 HYDRATION IV INFUSION INIT: CPT
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
## 2020-04-11 NOTE — RADIOLOGY REPORT (SQ)
EXAM DESCRIPTION: 



XR ABDOMEN SUPINE AND ERECT WITH CHEST (ABD ACUTE SERIES)



COMPLETED DATE/TME:  04/11/2020 06:47



CLINICAL HISTORY: 



39 years, Female, nausea/vomiting/diarrhea



COMPARISON:

Chest x-ray dated 2/18/2019



NUMBER OF VIEWS:

Three



TECHNIQUE:

AP view of the chest with supine and upright images of the

abdomen



LIMITATIONS:

None.



FINDINGS:



The lungs are clear. The heart is normal in size. There is no

pneumothorax or pleural effusion. The bones are unremarkable.

There is no intraperitoneal free air. No dilated loops of small

bowel are identified. There is a moderate amount of stool within

the colon.



IMPRESSION:



No acute cardiopulmonary abnormality. Nonobstructive bowel gas

pattern.

 



copyright 2011 [employer]- All Rights Reserved

## 2020-07-22 ENCOUNTER — HOSPITAL ENCOUNTER (EMERGENCY)
Dept: HOSPITAL 62 - ER | Age: 40
LOS: 1 days | Discharge: LEFT BEFORE DISCHARGE | End: 2020-07-23
Payer: MEDICAID

## 2020-07-22 VITALS — DIASTOLIC BLOOD PRESSURE: 62 MMHG | SYSTOLIC BLOOD PRESSURE: 122 MMHG

## 2020-07-22 DIAGNOSIS — Z53.20: Primary | ICD-10-CM

## 2020-07-22 DIAGNOSIS — F17.210: ICD-10-CM

## 2020-07-22 DIAGNOSIS — T18.9XXA: ICD-10-CM

## 2020-07-22 LAB
BARBITURATES UR QL SCN: NEGATIVE
METHADONE UR QL SCN: NEGATIVE
PCP UR QL SCN: NEGATIVE
URINE AMPHETAMINES SCREEN: NEGATIVE
URINE BENZODIAZEPINES SCREEN: (no result)
URINE COCAINE SCREEN: (no result)
URINE MARIJUANA (THC) SCREEN: (no result)

## 2020-07-22 PROCEDURE — 96372 THER/PROPH/DIAG INJ SC/IM: CPT

## 2020-07-22 PROCEDURE — 99281 EMR DPT VST MAYX REQ PHY/QHP: CPT

## 2020-07-22 PROCEDURE — 80307 DRUG TEST PRSMV CHEM ANLYZR: CPT

## 2020-07-22 PROCEDURE — 70360 X-RAY EXAM OF NECK: CPT

## 2020-07-22 NOTE — RADIOLOGY REPORT (SQ)
Neck soft tissue two view on 7/22/2020 at 8:16 PM



CLINICAL INDICATION: Feels like swallowed piece of metal



COMPARISON: CT from 5/22/2018



FINDINGS: There is a slight reversal of the normal cervical

lordosis. The epiglottis and airway is unremarkable. There is no

prevertebral soft tissue swelling. There is no radiopaque foreign

body.



IMPRESSION: No acute abnormality.

## 2020-07-22 NOTE — ER DOCUMENT REPORT
ED Medical Screen (RME)





- General


Chief Complaint: Swallowed Foreign Body


Stated Complaint: FOREIGN BODY IN THROAT


Time Seen by Provider: 07/22/20 19:53


TRAVEL OUTSIDE OF THE [place] IN LAST 30 DAYS: No





- HPI


Notes: 





07/22/20 20:03


40-year-old female presents emergency room for feeling like she has a small 

metal screen stuck to the right side of her throat after she was smoking a 

cigarette with CBD oil and somehow there was a screen in this device and she 

thinks that she got it stuck in the back of her throat.  patient is speaking in 

complete sentences.  She is forcing herself to vomit because she thinks this 

will get the screen out of her throat.  Denies any fevers or chills, denies any 

shortness of breath or chest pain.  Patient drink water without any issues.








I have greeted and performed a rapid initial assessment of this patient.  A 

comprehensive ED assessment and evaluation of the patient, analysis of test 

results and completion of the medical decision making process will be conducted 

by additional ED providers.





PHYSICAL EXAMINATION:





GENERAL: Well-appearing, well-nourished and in moderate distress from dry 

heaving. 


HEAD: Atraumatic, normocephalic.





EYES: Pupils equal round extraocular movements intact,  conjunctiva are normal.


ENT: Uvula midline.  No cervical or mandibular lymphadenopathy





NECK: Normal range of motion





CV: s1, s2 regular 





LUNGS: No respiratory distress





neuro: Clear speech in full sentences 








- Related Data


Allergies/Adverse Reactions: 


                                        





latex Allergy (Verified 04/11/20 06:39)


   











Past Medical History


Pulmonary Medical History: Reports: Hx Asthma


Neurological Medical History: Reports: Hx Seizures - States she has had one in 

the past, never on meds


Renal/ Medical History: Denies: Hx Peritoneal Dialysis


Psychiatric Medical History: Reports: Hx Bipolar Disorder


Past Surgical History: Reports: Hx Gynecologic Surgery - Upper endoscopy for 

endometriosis





- Immunizations


Hx Diphtheria, Pertussis, Tetanus Vaccination: No





Physical Exam





- Vital signs


Vitals: 





                                        











Temp Pulse Resp BP Pulse Ox


 


 98.5 F   105 H  24 H  122/62   96 


 


 07/22/20 19:54  07/22/20 19:54  07/22/20 19:54  07/22/20 19:54  07/22/20 19:54














Course





- Vital Signs


Vital signs: 





                                        











Temp Pulse Resp BP Pulse Ox


 


 98.5 F   105 H  24 H  122/62   96 


 


 07/22/20 19:54  07/22/20 19:54  07/22/20 19:54  07/22/20 19:54  07/22/20 19:54

## 2020-08-19 ENCOUNTER — HOSPITAL ENCOUNTER (EMERGENCY)
Dept: HOSPITAL 62 - ER | Age: 40
Discharge: HOME | End: 2020-08-19
Payer: MEDICAID

## 2020-08-19 VITALS — SYSTOLIC BLOOD PRESSURE: 122 MMHG | DIASTOLIC BLOOD PRESSURE: 79 MMHG

## 2020-08-19 DIAGNOSIS — Z91.040: ICD-10-CM

## 2020-08-19 DIAGNOSIS — R52: ICD-10-CM

## 2020-08-19 DIAGNOSIS — L98.9: ICD-10-CM

## 2020-08-19 DIAGNOSIS — R11.0: ICD-10-CM

## 2020-08-19 DIAGNOSIS — T63.301A: Primary | ICD-10-CM

## 2020-08-19 DIAGNOSIS — Z79.891: ICD-10-CM

## 2020-08-19 DIAGNOSIS — J45.909: ICD-10-CM

## 2020-08-19 DIAGNOSIS — R68.83: ICD-10-CM
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
## 2020-08-19 LAB
ADD MANUAL DIFF: NO
ALBUMIN SERPL-MCNC: 4.7 G/DL (ref 3.5–5)
ALP SERPL-CCNC: 69 U/L (ref 38–126)
ANION GAP SERPL CALC-SCNC: 9 MMOL/L (ref 5–19)
AST SERPL-CCNC: 30 U/L (ref 14–36)
BASOPHILS # BLD AUTO: 0 10^3/UL (ref 0–0.2)
BASOPHILS NFR BLD AUTO: 0.6 % (ref 0–2)
BILIRUB DIRECT SERPL-MCNC: 0 MG/DL (ref 0–0.4)
BILIRUB SERPL-MCNC: 0.5 MG/DL (ref 0.2–1.3)
BUN SERPL-MCNC: 16 MG/DL (ref 7–20)
CALCIUM: 9.2 MG/DL (ref 8.4–10.2)
CHLORIDE SERPL-SCNC: 102 MMOL/L (ref 98–107)
CO2 SERPL-SCNC: 27 MMOL/L (ref 22–30)
EOSINOPHIL # BLD AUTO: 0.1 10^3/UL (ref 0–0.6)
EOSINOPHIL NFR BLD AUTO: 1.7 % (ref 0–6)
ERYTHROCYTE [DISTWIDTH] IN BLOOD BY AUTOMATED COUNT: 13.5 % (ref 11.5–14)
GLUCOSE SERPL-MCNC: 102 MG/DL (ref 75–110)
HCT VFR BLD CALC: 37.3 % (ref 36–47)
HGB BLD-MCNC: 12.6 G/DL (ref 12–15.5)
LYMPHOCYTES # BLD AUTO: 1.7 10^3/UL (ref 0.5–4.7)
LYMPHOCYTES NFR BLD AUTO: 18.7 % (ref 13–45)
MCH RBC QN AUTO: 31 PG (ref 27–33.4)
MCHC RBC AUTO-ENTMCNC: 33.8 G/DL (ref 32–36)
MCV RBC AUTO: 92 FL (ref 80–97)
MONOCYTES # BLD AUTO: 0.4 10^3/UL (ref 0.1–1.4)
MONOCYTES NFR BLD AUTO: 4.3 % (ref 3–13)
NEUTROPHILS # BLD AUTO: 6.7 10^3/UL (ref 1.7–8.2)
NEUTS SEG NFR BLD AUTO: 74.7 % (ref 42–78)
PLATELET # BLD: 234 10^3/UL (ref 150–450)
POTASSIUM SERPL-SCNC: 3.8 MMOL/L (ref 3.6–5)
PROT SERPL-MCNC: 7.9 G/DL (ref 6.3–8.2)
RBC # BLD AUTO: 4.06 10^6/UL (ref 3.72–5.28)
TOTAL CELLS COUNTED % (AUTO): 100 %
WBC # BLD AUTO: 9 10^3/UL (ref 4–10.5)

## 2020-08-19 PROCEDURE — 85025 COMPLETE CBC W/AUTO DIFF WBC: CPT

## 2020-08-19 PROCEDURE — 36415 COLL VENOUS BLD VENIPUNCTURE: CPT

## 2020-08-19 PROCEDURE — 87040 BLOOD CULTURE FOR BACTERIA: CPT

## 2020-08-19 PROCEDURE — 99284 EMERGENCY DEPT VISIT MOD MDM: CPT

## 2020-08-19 PROCEDURE — 96372 THER/PROPH/DIAG INJ SC/IM: CPT

## 2020-08-19 PROCEDURE — 80053 COMPREHEN METABOLIC PANEL: CPT

## 2020-08-19 NOTE — ER DOCUMENT REPORT
ED Medical Screen (RME)





- General


Chief Complaint: Skin Sore(s)


Stated Complaint: POSSIBLE BUG BITE


Mode of Arrival: Ambulatory


Information source: Patient


Notes: 





43-year-old  female arrived by POV with boyfriend as .  She 

reports she was bitten on her right breast by a spider bite as she was working 

at [employer].  A coworker also had some bites.  Patient is very agitated as

if she were a methamphetamine addicted person.  Patient denies this.  She also 

has a large lesion to her left cheek approximately 3 cm diameter as well as a 

moist lesion to her left temple approximately 2 cm diameter.  Patient advises 

she has chills and aches all over.  Patient denies any dysuria constipation 

bloody stools but admits to nausea.


TRAVEL OUTSIDE OF THE [place] IN LAST 30 DAYS: No





- Related Data


Allergies/Adverse Reactions: 


                                        





latex Allergy (Verified 07/22/20 20:32)


   








Home Medications: sabutex





Past Medical History





- Social History


Frequency of alcohol use: None


Drug Abuse: None


Pulmonary Medical History: Reports: Hx Asthma


Neurological Medical History: Reports: Hx Seizures - States she has had one in 

the past, never on meds


Renal/ Medical History: Denies: Hx Peritoneal Dialysis


Psychiatric Medical History: Reports: Hx Bipolar Disorder


Past Surgical History: Reports: Hx Gynecologic Surgery - Upper endoscopy for 

endometriosis





- Immunizations


Hx Diphtheria, Pertussis, Tetanus Vaccination: No





Physical Exam





- Vital signs


Vitals: 


                                        











Temp Pulse Resp BP Pulse Ox


 


 98.2 F   83   16   130/97 H  98 


 


 08/19/20 03:55  08/19/20 03:55  08/19/20 03:55  08/19/20 03:55  08/19/20 03:55














- HEENT


Head: Normocephalic, Other - left cheek with 3 cm torrey flat lesion tender to p/p 

and left temple with 2 cm oozing


Eyes: Normal


Pupils: PERRL


Mouth/Lips: Normal


Mucous membranes: Normal


Pharynx: Normal


Neck: Normal





- Respiratory


Respiratory status: No respiratory distress


Chest status: Tender, Other - right breast with 2.5 cm draining ulceration


Breath sounds: Normal


Chest palpation: Normal





- Cardiovascular


Rhythm: Regular


Heart sounds: Normal auscultation


Murmur: No





- Abdominal


Inspection: Normal


Distension: No distension


Bowel sounds: Normal


Tenderness: Nontender


Organomegaly: No organomegaly





- Rectal


Hemorrhoids: Other - deferred





- Genitourinary


Bimanuel exam: Other - deferred





- Back


Back: Normal





- Extremities


General upper extremity: Normal inspection


General lower extremity: Normal inspection





- Neurological


Neuro grossly intact: Yes


Cognition: Normal


Orientation: AAOx4


Seth Coma Scale Eye Opening: Spontaneous


Norton Coma Scale Verbal: Oriented


Norton Coma Scale Motor: Obeys Commands


Seth Coma Scale Total: 15


Speech: Normal


Motor strength normal: LUE, RUE, LLE, RLE


Sensory: Normal





- Psychological


Associated symptoms: Anxious





- Skin


Skin Temperature: Warm


Skin Moisture: Dry


Skin Color: Other - left face and right breast lesions as per hpi/pe





Course





- Vital Signs


Vital signs: 


                                        











Temp Pulse Resp BP Pulse Ox


 


 98.2 F   83   16   130/97 H  98 


 


 08/19/20 03:55  08/19/20 03:55  08/19/20 03:55  08/19/20 03:55  08/19/20 03:55














- Laboratory


Result Diagrams: 


                                 08/19/20 05:12





                                 08/19/20 05:12





Critical Care Note





- Critical Care Note


Comments: 





Patient requests lancing of her spider bite of her right breast.  She was 

advised that a ulceration with no purulent underlying lesion should not be 

incised.  She denies any trauma to her left cheek but this appears to be a 

pressure type lesion as if she were laying on the cheek and temple after having 

extended time on a hard pressured surface.  She denies any assault to her face 

as well.





Doctor's Discharge





- Discharge


Clinical Impression: 


 MRSA type lesions





Spider bite wound


Qualifiers:


 Encounter type: initial encounter Injury intent: accidental or unintentional 

Qualified Code(s): T63.301A - Toxic effect of unspecified spider venom, 

accidental (unintentional), initial encounter





Condition: Good


Disposition: HOME, SELF-CARE


Additional Instructions: 


Follow-up with personal doctor today may return to ER if symptoms worsen or 

persist.  Please be advised any incisions only cause more healing time for the 

injured tissues.  Spider bites and MRSA type lesions that do not have underlying

purulent skin should not be incised.  Instead take antibiotics twice a day with 

Decadron once daily.  Cleanse the wounds with Hibiclens soap that is 

over-the-counter marked or [facility].  Apply Bactroban ointment to skin after washing 

and pat dry 


Prescriptions: 


Sulfamethoxazole/Trimethoprim [Bactrim Ds Tablet] 1 tab PO BID #20 tablet


Mupirocin [Bactroban 2% Ointment 22 gm] 1 applic TP BID #1 tube


Dexamethasone [Decadron 4 Mg Tablet] 4 mg PO DAILY #5 tablet


Cephalexin Monohydrate [Keflex 500 mg Capsule] 500 mg PO BID 10 Days #20 capsule


Chlorzoxazone [Parafon Forte Dsc 500 Mg Tablet] 500 mg PO BID PRN #15 tablet


 PRN Reason: Pain Scale Of 1


Forms:  Return to Work

## 2020-08-26 ENCOUNTER — HOSPITAL ENCOUNTER (EMERGENCY)
Dept: HOSPITAL 62 - ER | Age: 40
LOS: 1 days | Discharge: HOME | End: 2020-08-27
Payer: MEDICAID

## 2020-08-26 DIAGNOSIS — R07.81: Primary | ICD-10-CM

## 2020-08-26 DIAGNOSIS — F17.200: ICD-10-CM

## 2020-08-26 DIAGNOSIS — Z91.040: ICD-10-CM

## 2020-08-26 DIAGNOSIS — J45.909: ICD-10-CM

## 2020-08-26 DIAGNOSIS — Z79.891: ICD-10-CM

## 2020-08-26 DIAGNOSIS — N28.82: ICD-10-CM

## 2020-08-26 LAB
ADD MANUAL DIFF: NO
ADD MANUAL MICROSCOPIC: YES
ALBUMIN SERPL-MCNC: 4.1 G/DL (ref 3.5–5)
ALP SERPL-CCNC: 74 U/L (ref 38–126)
ANION GAP SERPL CALC-SCNC: 9 MMOL/L (ref 5–19)
APPEARANCE UR: CLEAR
APTT PPP: (no result) S
AST SERPL-CCNC: 17 U/L (ref 14–36)
BARBITURATES UR QL SCN: NEGATIVE
BASOPHILS # BLD AUTO: 0 10^3/UL (ref 0–0.2)
BASOPHILS NFR BLD AUTO: 0.5 % (ref 0–2)
BILIRUB DIRECT SERPL-MCNC: 0.3 MG/DL (ref 0–0.4)
BILIRUB SERPL-MCNC: 0.3 MG/DL (ref 0.2–1.3)
BILIRUB UR QL STRIP: NEGATIVE
BUN SERPL-MCNC: 10 MG/DL (ref 7–20)
CALCIUM: 9.1 MG/DL (ref 8.4–10.2)
CHLORIDE SERPL-SCNC: 97 MMOL/L (ref 98–107)
CO2 SERPL-SCNC: 32 MMOL/L (ref 22–30)
EOSINOPHIL # BLD AUTO: 0.1 10^3/UL (ref 0–0.6)
EOSINOPHIL NFR BLD AUTO: 1.2 % (ref 0–6)
ERYTHROCYTE [DISTWIDTH] IN BLOOD BY AUTOMATED COUNT: 13.6 % (ref 11.5–14)
GLUCOSE SERPL-MCNC: 106 MG/DL (ref 75–110)
GLUCOSE UR STRIP-MCNC: NEGATIVE MG/DL
HCT VFR BLD CALC: 35.3 % (ref 36–47)
HGB BLD-MCNC: 11.9 G/DL (ref 12–15.5)
KETONES UR STRIP-MCNC: NEGATIVE MG/DL
LYMPHOCYTES # BLD AUTO: 1.1 10^3/UL (ref 0.5–4.7)
LYMPHOCYTES NFR BLD AUTO: 19.5 % (ref 13–45)
MCH RBC QN AUTO: 30.4 PG (ref 27–33.4)
MCHC RBC AUTO-ENTMCNC: 33.6 G/DL (ref 32–36)
MCV RBC AUTO: 91 FL (ref 80–97)
METHADONE UR QL SCN: NEGATIVE
MONOCYTES # BLD AUTO: 0.4 10^3/UL (ref 0.1–1.4)
MONOCYTES NFR BLD AUTO: 7.5 % (ref 3–13)
NEUTROPHILS # BLD AUTO: 4.2 10^3/UL (ref 1.7–8.2)
NEUTS SEG NFR BLD AUTO: 71.3 % (ref 42–78)
NITRITE UR QL STRIP: NEGATIVE
PCP UR QL SCN: NEGATIVE
PH UR STRIP: 6 [PH] (ref 5–9)
PLATELET # BLD: 302 10^3/UL (ref 150–450)
POTASSIUM SERPL-SCNC: 3.7 MMOL/L (ref 3.6–5)
PROT SERPL-MCNC: 7.5 G/DL (ref 6.3–8.2)
PROT UR STRIP-MCNC: NEGATIVE MG/DL
RBC # BLD AUTO: 3.89 10^6/UL (ref 3.72–5.28)
SP GR UR STRIP: 1.01
TOTAL CELLS COUNTED % (AUTO): 100 %
URINE BENZODIAZEPINES SCREEN: NEGATIVE
URINE COCAINE SCREEN: (no result)
URINE MARIJUANA (THC) SCREEN: NEGATIVE
UROBILINOGEN UR-MCNC: NEGATIVE MG/DL (ref ?–2)
WBC # BLD AUTO: 5.9 10^3/UL (ref 4–10.5)
WBC #/AREA URNS HPF: (no result) /HPF

## 2020-08-26 PROCEDURE — 85025 COMPLETE CBC W/AUTO DIFF WBC: CPT

## 2020-08-26 PROCEDURE — 36415 COLL VENOUS BLD VENIPUNCTURE: CPT

## 2020-08-26 PROCEDURE — 87040 BLOOD CULTURE FOR BACTERIA: CPT

## 2020-08-26 PROCEDURE — 80307 DRUG TEST PRSMV CHEM ANLYZR: CPT

## 2020-08-26 PROCEDURE — 81001 URINALYSIS AUTO W/SCOPE: CPT

## 2020-08-26 PROCEDURE — 71046 X-RAY EXAM CHEST 2 VIEWS: CPT

## 2020-08-26 PROCEDURE — 76705 ECHO EXAM OF ABDOMEN: CPT

## 2020-08-26 PROCEDURE — 96372 THER/PROPH/DIAG INJ SC/IM: CPT

## 2020-08-26 PROCEDURE — 80053 COMPREHEN METABOLIC PANEL: CPT

## 2020-08-26 PROCEDURE — 99285 EMERGENCY DEPT VISIT HI MDM: CPT

## 2020-08-26 PROCEDURE — 84703 CHORIONIC GONADOTROPIN ASSAY: CPT

## 2020-08-26 PROCEDURE — 83690 ASSAY OF LIPASE: CPT

## 2020-08-26 PROCEDURE — 93005 ELECTROCARDIOGRAM TRACING: CPT

## 2020-08-26 PROCEDURE — 93010 ELECTROCARDIOGRAM REPORT: CPT

## 2020-08-26 NOTE — RADIOLOGY REPORT (SQ)
EXAM DESCRIPTION:

X-RAY CHEST-two views



CLINICAL HISTORY:

Right-sided chest pain



COMPARISON:

None



TECHNIQUE:

Two views of the chest.



FINDINGS:

There are no discrete air space infiltrates, pneumothoraces or

pleural effusions. 



The pulmonary vascularity is normal.



The cardiomediastinal silhouette is normal in size.



No suspicious lytic or blastic osseous lesions are identified.



IMPRESSION:

There are no acute lung parenchymal findings. If there is focal

point tenderness, dedicated rib series could be considered if

clinically indicated.

## 2020-08-26 NOTE — ER DOCUMENT REPORT
ED Medical Screen (RME)





- General


Chief Complaint: Upper Abdominal Pain


Stated Complaint: SHORTNESS OF BREATH/FLANK PAIN


Time Seen by Provider: 08/26/20 20:40


Mode of Arrival: Ambulatory


Information source: Patient


Notes: 





40-year-old female presented to ED for complaint of right upper quadrant lower 

lung and flank pain.  She states it hurts to take a breath for the last 3 days. 

She states eating or drinking does not make any difference.  She is on Subutex 

for a past heroin addiction.  She states she does not take any narcotics or any 

illicit drugs at this time.  She states she does smoke a pack a day but does not

drink any alcohol.  She states she was seen here about a week ago for spider 

bite and was not able to fill area of the prescriptions they gave her.  She 

states she has been tired fatigue and pain since she came in for the spider 

bite.  She states she has not been paid from her job so she does not have the 

money to fill prescriptions.

















I have greeted and performed a rapid initial assessment of this patient.  A 

comprehensive ED assessment and evaluation of the patient, analysis of test 

results and completion of medical decision making process will be conducted by 

an additional ED providers.


TRAVEL OUTSIDE OF THE [place] IN LAST 30 DAYS: No





- Related Data


Allergies/Adverse Reactions: 


                                        





latex Allergy (Verified 08/26/20 20:39)


   











Past Medical History


Pulmonary Medical History: Reports: Hx Asthma


Neurological Medical History: Reports: Hx Seizures - States she has had one in 

the past, never on meds


Renal/ Medical History: Denies: Hx Peritoneal Dialysis


Psychiatric Medical History: Reports: Hx Bipolar Disorder


Past Surgical History: Reports: Hx Gynecologic Surgery - Upper endoscopy for 

endometriosis





- Immunizations


Hx Diphtheria, Pertussis, Tetanus Vaccination: No





Physical Exam





- Vital signs


Vitals: 


                                        











Temp Pulse Resp BP Pulse Ox


 


 98.1 F   86   20   114/70   100 


 


 08/26/20 20:20  08/26/20 20:20  08/26/20 20:20  08/26/20 20:20  08/26/20 20:20














Course





- Vital Signs


Vital signs: 


                                        











Temp Pulse Resp BP Pulse Ox


 


 98.1 F   86   20   114/70   100 


 


 08/26/20 20:20  08/26/20 20:20  08/26/20 20:20  08/26/20 20:20  08/26/20 20:20

## 2020-08-26 NOTE — RADIOLOGY REPORT (SQ)
EXAM DESCRIPTION: 



US ABDOMEN LIMITED



COMPLETED DATE/TME:  08/26/2020 20:46



CLINICAL HISTORY: 



40 years, Female, right upper quadrant abdominal pain



COMPARISON:

None.



TECHNIQUE:

Axial 2-D grayscale images of the abdomen were acquired. Doppler

was utilized.



LIMITATIONS:

None.



FINDINGS:



Visualized portions of the pancreas appear normal in

echogenicity. 



Visualized portions of the abdominal aorta and IVC appear normal.



Liver is normal in echogenicity. It measures 17.1 cm in length.

Antegrade flow is documented within the main portal vein.



Gallbladder wall thickness measures 3 mm, possibly

artifactual/related to gallbladder contraction. No gallstones.

However, sonographic Matta sign was positive. Common bile duct

diameter measures 4 mm.



Right kidney measures 11.4 x 4.2 x 5.2 cm in size. There is mild

dilatation of the renal pelvis/visualized portions of the

proximal ureter.



No significant free fluid is identified within the imaged

abdomen.



IMPRESSION:



Mild gallbladder wall thickening, possibly artifactual/related to

gallbladder contraction. Otherwise, positive sonographic Matta

sign which is nonspecific in isolation. If there is continued

concern for cholecystitis, consider HIDA scan.



Mild dilatation of the right renal pelvis/proximal right ureter,

indeterminate in etiology.

 



copyright 2011 [employer]- All Rights Reserved

## 2020-08-26 NOTE — EKG REPORT
SEVERITY:- BORDERLINE ECG -

SINUS RHYTHM

BORDERLINE T ABNORMALITIES, ANT-LAT LEADS

:

Confirmed by: Lexy Reid MD 26-Aug-2020 21:57:39

## 2020-08-27 VITALS — SYSTOLIC BLOOD PRESSURE: 118 MMHG | DIASTOLIC BLOOD PRESSURE: 76 MMHG

## 2020-08-27 NOTE — ER DOCUMENT REPORT
ED General





- General


Chief Complaint: Upper Abdominal Pain


Stated Complaint: SHORTNESS OF BREATH/FLANK PAIN


Time Seen by Provider: 08/26/20 20:40


Mode of Arrival: Ambulatory


Notes: 


Patient is a 40-year-old female comes emergency department for chief complaint 

of right lower rib pain.  She states it hurts to take a deep breath.  She denies

trauma to the area, cough, fever.  She does state that she recently had a cough 

and fever but this all resolved.  She states she also got a spider bite on her 

right breast area, she was prescribed antibiotics but could not fill them, 

however this area has essentially resolved and healed at this point.  Patient 

denies chest pain, she denies any difficulty eating, she denies nausea or 

vomiting.  Patient is on Subutex and takes it daily.  She smokes, denies 

alcohol, denies any recent IV drug abuse.











TRAVEL OUTSIDE OF THE [place] IN LAST 30 DAYS: No





- Related Data


Allergies/Adverse Reactions: 


                                        





latex Allergy (Verified 08/26/20 20:39)


   








Home Medications: subutex





Past Medical History





- General


Information source: Patient





- Social History


Smoking Status: Current Every Day Smoker


Chew tobacco use (# tins/day): No


Frequency of alcohol use: None


Drug Abuse: None


Lives with: Family


Family History: Reviewed & Not Pertinent


Patient has homicidal ideation: No


Pulmonary Medical History: Reports: Hx Asthma


Neurological Medical History: Reports: Hx Seizures - States she has had one in 

the past, never on meds


Renal/ Medical History: Denies: Hx Peritoneal Dialysis


Psychiatric Medical History: Reports: Hx Bipolar Disorder


Past Surgical History: Reports: Hx Gynecologic Surgery - Upper endoscopy for 

endometriosis





- Immunizations


Hx Diphtheria, Pertussis, Tetanus Vaccination: No





Review of Systems





- Review of Systems


Constitutional: See HPI


EENT: No symptoms reported


Cardiovascular: No symptoms reported


Respiratory: See HPI


Gastrointestinal: No symptoms reported


Genitourinary: No symptoms reported


Female Genitourinary: No symptoms reported


Musculoskeletal: See HPI


Skin: See HPI


Hematologic/Lymphatic: No symptoms reported


Neurological/Psychological: No symptoms reported





Physical Exam





- Vital signs


Vitals: 


                                        











Temp Pulse Resp BP Pulse Ox


 


 98.1 F   86   20   114/70   100 


 


 08/26/20 20:20  08/26/20 20:20  08/26/20 20:20  08/26/20 20:20  08/26/20 20:20














- Notes


Notes: 





GENERAL: Alert, interacts well. No acute distress.  Patient is very thin but she

is otherwise well-appearing


HEAD: Normocephalic, atraumatic.


EYES: Pupils equal, round, and reactive to light. Extraocular movements intact.


ENT: Oral mucosa moist, tongue midline. Oropharynx unremarkable. Airway patent. 


NECK: Full range of motion. Supple. Trachea midline. No lymphadenopathy.


LUNGS: Clear to auscultation bilaterally, no wheezes, rales, or rhonchi. No 

respiratory distress.  Chest wall is tender with specific area of tenderness 

along the right lower ribs between the midclavicular and midaxillary lines.  No 

erythema, crepitus, or signs of trauma.  Pleuritic pain noted with deep breaths.


HEART: Regular rate and rhythm. No murmur


ABDOMEN: Soft, non-tender. Non-distended.  Patient seems tender in the right 

upper quadrant, however patient clarifies that she actually has tenderness just 

above this in the right rib area with palpation.  With re-palpation I do not 

appreciate any tenderness or guarding.


EXTREMITIES: Moves all 4 extremities spontaneously. No edema, normal radial and 

dorsalis pedis pulses bilaterally. No cyanosis.


BACK: no cervical, thoracic, lumbar midline tenderness. No saddle anesthesia, 

normal distal neurovascular exam. Moves all extremities in full range of motion.


NEUROLOGICAL: Alert and oriented x3. Normal speech. Cranial nerves II through 

XII grossly intact. Strength 5/5 in all extremities. 


PSYCH: Normal affect, normal mood.


SKIN: There is a healed area with a circular appearance suggesting a healed area

that was formally an abscess just above the right nipple.  There is no 

induration, fluctuance, erythema, or tenderness.  Skin exam is otherwise 

unremarkable.  Exam performed with Cortney RN at bedside.





Course





- Re-evaluation


Re-evalutation: 


Patient is able to eat without any difficulty, this does not produce pain, 

nausea, or vomiting.  Abdomen is actually unremarkable, patient has tenderness 

over the right lower ribs without any trauma.  Patient has pleuritic pain with 

deep breaths.  Patient recently recovered from a respiratory illness with 

coughing and fever.  She is not tachycardic, hypoxic, or short of breath.  She 

does not have a pain in her chest, only on the right lower ribs.  Chest x-ray 

unremarkable.  CBC, chemistry nonspecific, pregnancy negative.  Based on patient

specific exam I have a low suspicion of acute abdomen, pulmonary embolism, or 

other acute intrathoracic etiology.  I discussed with patient details.  I 

recommended anti-inflammatories, warm compresses, and she will continue her 

Suboxone.  After discussing she was also provided with dexamethasone in an 

attempt to speed up her recovery.  I discussed return precautions in detail with

patient and family at bedside.  They state appreciation and agreement.  Stable, 

smiling, talkative, well-appearing at time of discharge.





- Vital Signs


Vital signs: 


                                        











Temp Pulse Resp BP Pulse Ox


 


 97.9 F   103 H  20   104/70   96 


 


 08/27/20 02:27  08/27/20 02:27  08/26/20 20:20  08/27/20 02:27  08/27/20 02:27














- Laboratory


Result Diagrams: 


                                 08/26/20 20:30





                                 08/26/20 20:30


Laboratory results interpreted by me: 


                                        











  08/26/20 08/26/20





  20:30 20:30


 


Hgb  11.9 L 


 


Hct  35.3 L 


 


Chloride   97 L


 


Carbon Dioxide   32 H


 


Creatinine   0.49 L














- EKG Interpretation by Me


Additional EKG results interpreted by me: 


EKG shows sinus rhythm at a rate of 96, QTc 465, no T wave inversions or ST 

segment changes in consecutive leads, normal axis.





Discharge





- Discharge


Clinical Impression: 


 Rib pain on right side, Pleuritic pain





Condition: Stable


Disposition: HOME, SELF-CARE


Additional Instructions: 


Your evaluation is most consistent with patient and strain of the cartilage in 

your chest wall.  Apply heat to the area, take over-the-counter anti-

inflammatories, rest, this should gradually resolve.  Follow-up with primary 

care.





Return if you worsen including difficulty breathing, spiking fevers, severe 

worsening pain, passing out, or any other concerning or worsening symptoms.


Prescriptions: 


Albuterol Sulfate [Proair HFA Inhalation Aerosol 8.5 gm MDI] 2 puff IH Q4H PRN 

#1 mdi


 PRN Reason: 


Forms:  Smoking Cessation Education

## 2020-08-30 ENCOUNTER — HOSPITAL ENCOUNTER (EMERGENCY)
Dept: HOSPITAL 62 - ER | Age: 40
Discharge: HOME | End: 2020-08-30
Payer: MEDICAID

## 2020-08-30 VITALS — SYSTOLIC BLOOD PRESSURE: 106 MMHG | DIASTOLIC BLOOD PRESSURE: 62 MMHG

## 2020-08-30 DIAGNOSIS — F17.200: ICD-10-CM

## 2020-08-30 DIAGNOSIS — Z91.040: ICD-10-CM

## 2020-08-30 DIAGNOSIS — R10.11: Primary | ICD-10-CM

## 2020-08-30 LAB
ADD MANUAL DIFF: NO
ALBUMIN SERPL-MCNC: 4 G/DL (ref 3.5–5)
ALP SERPL-CCNC: 91 U/L (ref 38–126)
ANION GAP SERPL CALC-SCNC: 8 MMOL/L (ref 5–19)
AST SERPL-CCNC: 17 U/L (ref 14–36)
BASOPHILS # BLD AUTO: 0 10^3/UL (ref 0–0.2)
BASOPHILS NFR BLD AUTO: 0.6 % (ref 0–2)
BILIRUB DIRECT SERPL-MCNC: 0.3 MG/DL (ref 0–0.4)
BILIRUB SERPL-MCNC: 0.3 MG/DL (ref 0.2–1.3)
BUN SERPL-MCNC: 10 MG/DL (ref 7–20)
CALCIUM: 9.2 MG/DL (ref 8.4–10.2)
CHLORIDE SERPL-SCNC: 99 MMOL/L (ref 98–107)
CO2 SERPL-SCNC: 31 MMOL/L (ref 22–30)
EOSINOPHIL # BLD AUTO: 0.2 10^3/UL (ref 0–0.6)
EOSINOPHIL NFR BLD AUTO: 4.2 % (ref 0–6)
ERYTHROCYTE [DISTWIDTH] IN BLOOD BY AUTOMATED COUNT: 13.3 % (ref 11.5–14)
GLUCOSE SERPL-MCNC: 106 MG/DL (ref 75–110)
HCT VFR BLD CALC: 36.1 % (ref 36–47)
HGB BLD-MCNC: 12.5 G/DL (ref 12–15.5)
LYMPHOCYTES # BLD AUTO: 0.9 10^3/UL (ref 0.5–4.7)
LYMPHOCYTES NFR BLD AUTO: 16.9 % (ref 13–45)
MCH RBC QN AUTO: 31 PG (ref 27–33.4)
MCHC RBC AUTO-ENTMCNC: 34.6 G/DL (ref 32–36)
MCV RBC AUTO: 90 FL (ref 80–97)
MONOCYTES # BLD AUTO: 0.5 10^3/UL (ref 0.1–1.4)
MONOCYTES NFR BLD AUTO: 8.6 % (ref 3–13)
NEUTROPHILS # BLD AUTO: 3.9 10^3/UL (ref 1.7–8.2)
NEUTS SEG NFR BLD AUTO: 69.7 % (ref 42–78)
PLATELET # BLD: 382 10^3/UL (ref 150–450)
POTASSIUM SERPL-SCNC: 4.3 MMOL/L (ref 3.6–5)
PROT SERPL-MCNC: 7.6 G/DL (ref 6.3–8.2)
RBC # BLD AUTO: 4.02 10^6/UL (ref 3.72–5.28)
TOTAL CELLS COUNTED % (AUTO): 100 %
WBC # BLD AUTO: 5.6 10^3/UL (ref 4–10.5)

## 2020-08-30 PROCEDURE — 76705 ECHO EXAM OF ABDOMEN: CPT

## 2020-08-30 PROCEDURE — 80053 COMPREHEN METABOLIC PANEL: CPT

## 2020-08-30 PROCEDURE — 83690 ASSAY OF LIPASE: CPT

## 2020-08-30 PROCEDURE — 99285 EMERGENCY DEPT VISIT HI MDM: CPT

## 2020-08-30 PROCEDURE — S0119 ONDANSETRON 4 MG: HCPCS

## 2020-08-30 PROCEDURE — 85025 COMPLETE CBC W/AUTO DIFF WBC: CPT

## 2020-08-30 PROCEDURE — 74177 CT ABD & PELVIS W/CONTRAST: CPT

## 2020-08-30 PROCEDURE — 96360 HYDRATION IV INFUSION INIT: CPT

## 2020-08-30 PROCEDURE — 36415 COLL VENOUS BLD VENIPUNCTURE: CPT

## 2020-08-30 NOTE — RADIOLOGY REPORT (SQ)
EXAM DESCRIPTION:  U/S ABDOMEN LIMITED W/O DOP



IMAGES COMPLETED DATE/TIME:  8/30/2020 2:36 pm



REASON FOR STUDY:  RUQ abdominal pain



COMPARISON:  Abdominal ultrasound 8/26/2020



TECHNIQUE:  Dynamic and static grayscale images acquired of the abdomen and recorded on PACS. Kevon le selected color Doppler and spectral images recorded.



LIMITATIONS:  None.



FINDINGS:  PANCREAS: No masses.  Visualized pancreatic duct normal caliber.

LIVER: No masses. Echotexture normal.

LIVER VASCULATURE: Normal directional flow of the main portal vein and hepatic veins.

GALLBLADDER: Contracted, limiting evaluation.  Negative sonographic Matta sign.  The gallbladder wal
l measures 2 mm.

ULTRASOUND-DETECTED MATTA'S SIGN: Negative.

INTRAHEPATIC DUCTS AND COMMON DUCT: CBD and intrahepatic ducts normal caliber. No filling defects.

INFERIOR VENA CAVA: Normal flow.

AORTA: No aneurysm.

RIGHT KIDNEY:  Normal size. Normal echogenicity. No solid or suspicious masses. No hydronephrosis. No
 calcifications.

PERITONEAL AND RIGHT PLEURAL SPACE: No ascites or effusions.

OTHER: No other significant findings.



IMPRESSION:  Contracted gallbladder, limiting evaluation, without other sonographic findings to sugge
st cholecystitis.  Otherwise, unremarkable right upper quadrant ultrasound.



TECHNICAL DOCUMENTATION:  JOB ID:  [id number]

 2011 [employer]- All Rights Reserved



Reading location - IP/workstation name: [email or web address]

## 2020-08-30 NOTE — ER DOCUMENT REPORT
ED Medical Screen (RME)





- General


Chief Complaint: Flank Pain


Stated Complaint: FLANK PAIN


Time Seen by Provider: 08/30/20 11:22


Information source: Patient


Notes: 





Patient presents complaining of right upper quadrant pain and right flank pain 

for the past 2 weeks.  Patient states pain is worse after eating.  Patient 

reports nausea and some dysuria symptoms.  Patient denies any fever or vomiting.

 Patient states that she feels short of breath because she cannot take a deep 

breath because of the pain.  Patient denies any cough or cold symptoms.  Patient

reports a history of endometriosis.





I have greeted and performed a rapid initial assessment of this patient.  A 

comprehensive ED assessment and evaluation of the patient, analysis of test 

results and completion of the medical decision making process will be conducted 

by additional ED providers.


TRAVEL OUTSIDE OF THE [place] IN LAST 30 DAYS: No





- Related Data


Allergies/Adverse Reactions: 


                                        





latex Allergy (Verified 08/30/20 11:20)


   











Past Medical History


Pulmonary Medical History: Reports: Hx Asthma


Neurological Medical History: Reports: Hx Seizures - States she has had one in 

the past, never on meds


Renal/ Medical History: Denies: Hx Peritoneal Dialysis


Psychiatric Medical History: Reports: Hx Bipolar Disorder


Past Surgical History: Reports: Hx Gynecologic Surgery - Upper endoscopy for 

endometriosis





- Immunizations


Hx Diphtheria, Pertussis, Tetanus Vaccination: No





Physical Exam





- Vital signs


Vitals: 





                                        











Temp Pulse Resp BP Pulse Ox


 


 98.0 F   85   16   97/63 L  95 


 


 08/30/20 11:08  08/30/20 11:08  08/30/20 11:08  08/30/20 11:08  08/30/20 11:08














- Abdominal


Tenderness: Tender - Upper quadrant





- Back


Back: CVA tenderness - Left





Course





- Vital Signs


Vital signs: 





                                        











Temp Pulse Resp BP Pulse Ox


 


 98.0 F   85   16   97/63 L  95 


 


 08/30/20 11:08  08/30/20 11:08  08/30/20 11:08  08/30/20 11:08  08/30/20 11:08

## 2020-08-30 NOTE — ER DOCUMENT REPORT
ED GI/





- General


Chief Complaint: Abdominal Pain


Stated Complaint: FLANK PAIN


Time Seen by Provider: 08/30/20 11:22


Notes: 





Patient is a 40-year-old female who presents emergency department with a chief 

complaint of abdominal pain.  Patient states that she has had her pain for the 

past week and a half.  Patient patient states that the pain is mainly in the 

right side of her abdomen.  Patient states that she also hit her abdomen when 

she was at work.  She states that she hit her abdomen on encounter. States that 

Flexeril has not helped her.  Denies any dysuria, vaginal discharge, vaginal 

pain, or vomiting.


TRAVEL OUTSIDE OF THE [place] IN LAST 30 DAYS: No





- Related Data


Allergies/Adverse Reactions: 


                                        





latex Allergy (Verified 08/30/20 11:20)


   











Past Medical History





- General


Information source: Patient





- Social History


Smoking Status: Current Some Day Smoker


Family History: Reviewed & Not Pertinent


Patient has homicidal ideation: No


Pulmonary Medical History: Reports: Hx Asthma


Neurological Medical History: Reports: Hx Seizures - States she has had one in 

the past, never on meds


Renal/ Medical History: Denies: Hx Peritoneal Dialysis


Psychiatric Medical History: Reports: Hx Bipolar Disorder


Past Surgical History: Reports: Hx Gynecologic Surgery - Upper endoscopy for 

endometriosis





- Immunizations


Hx Diphtheria, Pertussis, Tetanus Vaccination: No





Review of Systems





- Review of Systems


Notes: 





REVIEW OF SYSTEMS:





CONSTITUTIONAL :    Denies recent illness.  Denies recent unintentional weight 

loss.  Denies fever,  chills, or sweats. 


EENT: Denies eye, ear, throat, or mouth pain, discharge, or symptoms.  Denies 

nasal or sinus congestion.


CARDIOVASCULAR:  Denies chest pain.


RESPIRATORY: Denies shortness of breath, cough, congestion, difficulty 

breathing, or wheezing. 


GASTROINTESTINAL: See HPI.


GENITOURINARY:  Denies difficulty urinating, burning, blood in urine, urgency or

frequency.


MUSCULOSKELETAL:  Denies neck and back pain.  Denies joint pain or swelling.


SKIN:   Denies rash, itchiness, or lesions


HEMATOLOGIC :   Denies easy bruising or bleeding.


LYMPHATIC:  Denies swollen, painful, enlarged glands.


NEUROLOGICAL: Denies no numbness or tingling denies weakness.  Denies headache. 

Denies altered mental status.  Denies alteration in speech.


PSYCHIATRIC:  Denies stress, anxiety, alteration in sleep patterns, or 

depression.





All other systems reviewed and negative.





Physical Exam





- Vital signs


Vitals: 


                                        











Temp Pulse Resp BP Pulse Ox


 


 98.0 F   85   16   97/63 L  95 


 


 08/30/20 11:08  08/30/20 11:08  08/30/20 11:08  08/30/20 11:08  08/30/20 11:08














- Notes


Notes: 





PHYSICAL EXAMINATION:





GENERAL: Appears well, healthy, well-nourished, no acute distress. 





HEAD:  Normocephalic, atraumatic.





EYES:  PERRL, conjunctiva normal, all extraocular movements intact, sclera 

nonicteric





ENT:  Moist mucous membranes. 





NECK: Supple, no noticeable swelling, redness, rash.  Normal range of motion.





LUNGS: Equal breath sounds bilaterally and clear to auscultation.  No wheezes 

rales or rhonchi.





CARDIOVASCULAR: S1-S2, regular rate, regular rhythm.  Radial pulses 2+, normal.





ABDOMEN: Normoactive bowel sounds.  Soft, tender generalized abdomen, guarding a

nd in all fields.





EXTREMITIES: Normal strength and range of motion, no pitting or edema.  No 

cyanosis. 





NEUROLOGICAL: Moves all extremities upon command.  Strength 5/5 in all 

extremities. 





PSYCH: Normal mood, normal affect.





SKIN: Warm, dry.  No rash, lesions, ulcerations noted.  Normal skin turgor.





Course





- Re-evaluation


Re-evalutation: 





08/30/20 15:01


Hematology is unremarkable.  Chemistries are also unremarkable.  Patient has a 

trace amount of fluid noted on CT scan at the Morison's pouch.  I suspect she 

possibly hit her abdomen hard enough to pursue a blood vessel in the area.  

Advised patient that this will heal on its own.  We will give her a small amount

of Ultram to help with her pain.  She is in agreement with this plan.  Follow-up

precautions were given.  Verbal discharge instructions were given to the patien

t.  They verbalized understanding.  They are stable for discharge.





08/30/20 15:59


I was called by [facility] pharmacy and they informed me that the patient was on 

buprenorphine already.  I had them cancel the Ultram.








- Vital Signs


Vital signs: 


                                        











Temp Pulse Resp BP Pulse Ox


 


 97.5 F   79   18   106/62   100 


 


 08/30/20 15:27  08/30/20 15:27  08/30/20 15:27  08/30/20 15:27  08/30/20 15:27














- Laboratory


Result Diagrams: 


                                 08/30/20 12:22





                                 08/30/20 12:22


Laboratory results interpreted by me: 


                                        











  08/30/20





  12:22


 


Carbon Dioxide  31 H














Discharge





- Discharge


Clinical Impression: 


Abdominal pain


Qualifiers:


 Abdominal location: right upper quadrant Qualified Code(s): R10.11 - Right 

upper quadrant pain





Condition: Stable


Disposition: HOME, SELF-CARE


Additional Instructions: 


You were seen today in the emergency department for abdominal pain.  Your CT 

shows that you have a small amount of fluid in your abdomen from hitting your 

abdomen on the counter.  This is what is causing your pain.  You can take the 

pain medication as needed.  Make sure you rest.


Prescriptions: 


Tramadol HCl [Ultram 50 mg Tablet] 50 mg PO Q6HP PRN #6 tablet


 PRN Reason: 


Tramadol HCl [Ultram 50 mg Tablet] 50 mg PO Q6HP PRN #6 tablet


 PRN Reason:

## 2020-08-30 NOTE — RADIOLOGY REPORT (SQ)
EXAM DESCRIPTION:  CT ABD/PELVIS WITH IV ONLY



IMAGES COMPLETED DATE/TIME:  8/30/2020 2:22 pm



REASON FOR STUDY:  abdominal pain



COMPARISON:   None.



TECHNIQUE:  CT scan of the abdomen and pelvis performed using helical scanning technique with dynamic
 intravenous contrast injection.  No oral contrast. Images reviewed with lung, soft tissue, and bone 
windows. Reconstructed coronal and sagittal MPR images reviewed. Delayed images for evaluation of the
 urinary system also acquired. All images stored on PACS.

All CT scanners at this facility use dose modulation, iterative reconstruction, and/or weight based d
osing when appropriate to reduce radiation dose to as low as reasonably achievable (ALARA).

[facility]: Dose Right  [facility]: CareDose    [facility]: Dose Right    [facility]: Teradose 4D    [facility]: Smart Technologies



CONTRAST TYPE AND DOSE:  contrast/concentration: Isovue 350.00 mmol/ml; Total Contrast Delivered: 59.
0 ml; Total Saline Delivered: 65.0 ml



RENAL FUNCTION:  BUN 10; creatinine 0.72



RADIATION DOSE:  CT Rad equipment meets quality standard of care and radiation dose reduction techniq
ues were employed. CTDIvol: 4.8 - 4.9 mGy. DLP: 469 mGy-cm..



LIMITATIONS:  None.



FINDINGS:  LOWER CHEST: No significant findings. No nodules or infiltrates.

LIVER: Normal size. No masses.  No dilated ducts.

SPLEEN: Normal size. No focal lesions.

PANCREAS: No masses. No significant calcifications. No adjacent inflammation or peripancreatic fluid 
collections. Pancreatic duct not dilated.

GALLBLADDER: Decompressed.  No identified stones by CT criteria. No inflammatory changes to suggest c
holecystitis.

ADRENAL GLANDS: No significant masses or asymmetry.

RIGHT KIDNEY AND URETER: No solid masses. No significant calcification. No hydronephrosis or hydroure
ter.

LEFT KIDNEY AND URETER: No solid masses. No significant calcification. No hydronephrosis or hydrouret
er.

AORTA AND VESSELS: No aneurysm. No dissection. Renal arteries, SMA, celiac without stenosis.

RETROPERITONEUM: No retroperitoneal adenopathy, hemorrhage or masses.

BOWEL AND PERITONEAL CAVITY: No masses or inflammatory changes. No peritoneal masses.  No dilated loo
ps.  Trace fluid is noted in Morison's pouch.

APPENDIX: Not definitively visualized, though there are no acute inflammatory changes about the cecum
.

PELVIS: No mass.  No free fluid. Normal bladder.

ABDOMINAL WALL: No masses. No hernias.

BONES: No significant or acute findings.

OTHER: No other significant finding.



IMPRESSION:  Trace fluid is noted in Morison's pouch.  Otherwise, no acute inflammatory changes in th
e abdomen or pelvis.



TECHNICAL DOCUMENTATION:  JOB ID:  [id number]

Quality ID # [id number]: Final reports with documentation of one or more dose reduction techniques (e.g., Au
tomated exposure control, adjustment of the mA and/or kV according to patient size, use of iterative 
reconstruction technique)

 2011 [employer]- All Rights Reserved



Reading location - IP/workstation name: [email or web address]

## 2020-09-25 ENCOUNTER — HOSPITAL ENCOUNTER (EMERGENCY)
Dept: HOSPITAL 62 - ER | Age: 40
Discharge: HOME | End: 2020-09-25
Payer: MEDICAID

## 2020-09-25 VITALS — DIASTOLIC BLOOD PRESSURE: 61 MMHG | SYSTOLIC BLOOD PRESSURE: 106 MMHG

## 2020-09-25 DIAGNOSIS — N30.90: Primary | ICD-10-CM

## 2020-09-25 LAB
APPEARANCE UR: (no result)
APTT PPP: YELLOW S
BILIRUB UR QL STRIP: NEGATIVE
GLUCOSE UR STRIP-MCNC: NEGATIVE MG/DL
KETONES UR STRIP-MCNC: NEGATIVE MG/DL
NITRITE UR QL STRIP: NEGATIVE
PH UR STRIP: 7 [PH] (ref 5–9)
PROT UR STRIP-MCNC: NEGATIVE MG/DL
SP GR UR STRIP: 1.02
UROBILINOGEN UR-MCNC: NEGATIVE MG/DL (ref ?–2)

## 2020-09-25 PROCEDURE — 87086 URINE CULTURE/COLONY COUNT: CPT

## 2020-09-25 PROCEDURE — 81025 URINE PREGNANCY TEST: CPT

## 2020-09-25 PROCEDURE — 81001 URINALYSIS AUTO W/SCOPE: CPT

## 2020-09-25 PROCEDURE — 99284 EMERGENCY DEPT VISIT MOD MDM: CPT

## 2020-09-25 PROCEDURE — 96372 THER/PROPH/DIAG INJ SC/IM: CPT

## 2020-09-25 PROCEDURE — 87088 URINE BACTERIA CULTURE: CPT

## 2020-09-25 NOTE — ER DOCUMENT REPORT
HPI





- HPI


Time Seen by Provider: 09/25/20 20:07


Pain Level: 3


Notes: 





40-year-old female patient presenting to the emergency department with concerns 

that she may have a urinary tract infection.  Patient states she gets them 

frequently.  She states that she has had burning with urination with nausea for 

the last few days.  She further states that she has no money and cannot afford 

antibiotics and she is requesting that if she has a UTI we give her an 

antibiotic injection.  She denies fever, vomiting or abdominal pain.








- ROS


Systems Reviewed and Negative: Yes All other systems reviewed and negative





- CONSTITUTIONAL


Constitutional: DENIES: Fever, Chills





- GASTROINTESTINAL


Gastrointestinal: REPORTS: Nausea.  DENIES: Abdominal Pain





- URINARY


Urinary: REPORTS: Dysuria, Frequency





- REPRODUCTIVE


Reproductive: DENIES: Pregnant:





Past Medical History





- General


Information source: Patient





- Social History


Smoking Status: Current Every Day Smoker


Frequency of alcohol use: Rare


Drug Abuse: Marijuana


Family History: Reviewed & Not Pertinent


Patient has homicidal ideation: No


Pulmonary Medical History: Reports: Hx Asthma


Neurological Medical History: Reports: Hx Seizures - States she has had one in 

the past, never on meds


Renal/ Medical History: Denies: Hx Peritoneal Dialysis


Psychiatric Medical History: Reports: Hx Bipolar Disorder


Past Surgical History: Reports: Hx Gynecologic Surgery - Upper endoscopy for 

endometriosis





- Immunizations


Hx Diphtheria, Pertussis, Tetanus Vaccination: No





Vertical Provider Document





- CONSTITUTIONAL


Notes: 





PHYSICAL EXAMINATION:





GENERAL: Well-appearing, well-nourished and in no acute distress.





HEAD: Atraumatic, normocephalic.





EYES: Pupils equal round extraocular movements intact,  conjunctiva are normal.





ENT: Nares patent





NECK: Normal range of motion





LUNGS: No respiratory distress





Abdomen: Abdomen soft, nontender.





Musculoskeletal: Normal range of motion





NEUROLOGICAL:  Normal speech, normal gait. 





PSYCH: Normal mood, normal affect.





SKIN: Warm, Dry, normal turgor, no rashes or lesions noted.





- INFECTION CONTROL


TRAVEL OUTSIDE OF THE [place] IN LAST 30 DAYS: No





Course





- Re-evaluation


Re-evalutation: 





Urinalysis is not that impressive however patient is symptomatic.  She declines 

oral antibiotics stating that she will not be able to afford to get them filled.

 We will give her an injection of Rocephin pending a urine culture.  Patient 

given strict ED return precautions patient verbalizes understanding and 

agreement with this plan.





- Vital Signs


Vital signs: 


                                        











Temp Pulse Resp BP Pulse Ox


 


 98 F   72   14   102/62   96 


 


 09/25/20 20:03  09/25/20 18:37  09/25/20 18:37  09/25/20 18:37  09/25/20 18:37














Discharge





- Discharge


Clinical Impression: 


 Cystitis





Condition: Stable


Disposition: HOME, SELF-CARE


Additional Instructions: 


You were given a dose of antibiotics here in the emergency department.  A urine 

culture is pending.  Someone will contact you if there is any abnormality with 

your urine culture.  Please return to the emergency department if you worsen in 

any way, you began persistently vomiting or you develop a fever greater than 

101.


Forms:  Return to Work

## 2020-11-20 ENCOUNTER — HOSPITAL ENCOUNTER (EMERGENCY)
Dept: HOSPITAL 62 - ER | Age: 40
Discharge: HOME | End: 2020-11-20
Payer: MEDICAID

## 2020-11-20 VITALS — DIASTOLIC BLOOD PRESSURE: 61 MMHG | SYSTOLIC BLOOD PRESSURE: 107 MMHG

## 2020-11-20 DIAGNOSIS — R06.02: ICD-10-CM

## 2020-11-20 DIAGNOSIS — F17.210: ICD-10-CM

## 2020-11-20 DIAGNOSIS — R11.10: ICD-10-CM
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
## 2020-11-20 DIAGNOSIS — S22.42XA: Primary | ICD-10-CM

## 2020-11-20 DIAGNOSIS — J45.909: ICD-10-CM

## 2020-11-20 DIAGNOSIS — R07.81: ICD-10-CM

## 2020-11-20 DIAGNOSIS — Z91.040: ICD-10-CM

## 2020-11-20 PROCEDURE — 96372 THER/PROPH/DIAG INJ SC/IM: CPT

## 2020-11-20 PROCEDURE — 99284 EMERGENCY DEPT VISIT MOD MDM: CPT

## 2020-11-20 PROCEDURE — 71111 X-RAY EXAM RIBS/CHEST4/> VWS: CPT
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
